# Patient Record
Sex: FEMALE | Race: WHITE | NOT HISPANIC OR LATINO | Employment: FULL TIME | URBAN - METROPOLITAN AREA
[De-identification: names, ages, dates, MRNs, and addresses within clinical notes are randomized per-mention and may not be internally consistent; named-entity substitution may affect disease eponyms.]

---

## 2024-06-11 ENCOUNTER — HOSPITAL ENCOUNTER (OUTPATIENT)
Facility: HOSPITAL | Age: 54
Discharge: HOME | End: 2024-06-13
Attending: EMERGENCY MEDICINE | Admitting: INTERNAL MEDICINE
Payer: MEDICAID

## 2024-06-11 DIAGNOSIS — I16.0 HYPERTENSIVE URGENCY: Primary | ICD-10-CM

## 2024-06-11 DIAGNOSIS — N39.0 ACUTE LOWER UTI: ICD-10-CM

## 2024-06-11 DIAGNOSIS — R07.9 CHEST PAIN: ICD-10-CM

## 2024-06-11 DIAGNOSIS — R07.9 CHEST PAIN, UNSPECIFIED TYPE: ICD-10-CM

## 2024-06-11 DIAGNOSIS — R07.9 EXERTIONAL CHEST PAIN: ICD-10-CM

## 2024-06-11 PROBLEM — I10 HYPERTENSION: Status: ACTIVE | Noted: 2024-06-11

## 2024-06-11 LAB
ALBUMIN SERPL BCP-MCNC: 3.8 G/DL (ref 3.5–5.2)
ALP SERPL-CCNC: 177 U/L (ref 55–135)
ALT SERPL W/O P-5'-P-CCNC: 16 U/L (ref 10–44)
ANION GAP SERPL CALC-SCNC: 9 MMOL/L (ref 8–16)
AST SERPL-CCNC: 21 U/L (ref 10–40)
BACTERIA #/AREA URNS HPF: ABNORMAL /HPF
BASOPHILS # BLD AUTO: 0.04 K/UL (ref 0–0.2)
BASOPHILS NFR BLD: 0.4 % (ref 0–1.9)
BILIRUB SERPL-MCNC: 0.3 MG/DL (ref 0.1–1)
BILIRUB UR QL STRIP: NEGATIVE
BNP SERPL-MCNC: <10 PG/ML (ref 0–99)
BUN SERPL-MCNC: 11 MG/DL (ref 6–20)
CALCIUM SERPL-MCNC: 9.5 MG/DL (ref 8.7–10.5)
CHLORIDE SERPL-SCNC: 107 MMOL/L (ref 95–110)
CLARITY UR: ABNORMAL
CO2 SERPL-SCNC: 25 MMOL/L (ref 23–29)
COLOR UR: YELLOW
CREAT SERPL-MCNC: 0.8 MG/DL (ref 0.5–1.4)
DIFFERENTIAL METHOD BLD: NORMAL
EOSINOPHIL # BLD AUTO: 0.3 K/UL (ref 0–0.5)
EOSINOPHIL NFR BLD: 2.4 % (ref 0–8)
ERYTHROCYTE [DISTWIDTH] IN BLOOD BY AUTOMATED COUNT: 13.2 % (ref 11.5–14.5)
EST. GFR  (NO RACE VARIABLE): >60 ML/MIN/1.73 M^2
GLUCOSE SERPL-MCNC: 100 MG/DL (ref 70–110)
GLUCOSE UR QL STRIP: NEGATIVE
HCT VFR BLD AUTO: 44 % (ref 37–48.5)
HGB BLD-MCNC: 14.1 G/DL (ref 12–16)
HGB UR QL STRIP: NEGATIVE
IMM GRANULOCYTES # BLD AUTO: 0.03 K/UL (ref 0–0.04)
IMM GRANULOCYTES NFR BLD AUTO: 0.3 % (ref 0–0.5)
KETONES UR QL STRIP: NEGATIVE
LEUKOCYTE ESTERASE UR QL STRIP: ABNORMAL
LYMPHOCYTES # BLD AUTO: 3 K/UL (ref 1–4.8)
LYMPHOCYTES NFR BLD: 28.5 % (ref 18–48)
MCH RBC QN AUTO: 27.9 PG (ref 27–31)
MCHC RBC AUTO-ENTMCNC: 32 G/DL (ref 32–36)
MCV RBC AUTO: 87 FL (ref 82–98)
MICROSCOPIC COMMENT: ABNORMAL
MONOCYTES # BLD AUTO: 0.7 K/UL (ref 0.3–1)
MONOCYTES NFR BLD: 6.7 % (ref 4–15)
NEUTROPHILS # BLD AUTO: 6.6 K/UL (ref 1.8–7.7)
NEUTROPHILS NFR BLD: 61.7 % (ref 38–73)
NITRITE UR QL STRIP: NEGATIVE
NRBC BLD-RTO: 0 /100 WBC
OHS QRS DURATION: 82 MS
OHS QTC CALCULATION: 468 MS
PH UR STRIP: 7 [PH] (ref 5–8)
PLATELET # BLD AUTO: 369 K/UL (ref 150–450)
PMV BLD AUTO: 10.2 FL (ref 9.2–12.9)
POTASSIUM SERPL-SCNC: 4 MMOL/L (ref 3.5–5.1)
PROT SERPL-MCNC: 7.7 G/DL (ref 6–8.4)
PROT UR QL STRIP: ABNORMAL
RBC # BLD AUTO: 5.05 M/UL (ref 4–5.4)
RBC #/AREA URNS HPF: 2 /HPF (ref 0–4)
SODIUM SERPL-SCNC: 141 MMOL/L (ref 136–145)
SP GR UR STRIP: 1.02 (ref 1–1.03)
SQUAMOUS #/AREA URNS HPF: 14 /HPF
TROPONIN I SERPL DL<=0.01 NG/ML-MCNC: <0.006 NG/ML (ref 0–0.03)
TROPONIN I SERPL DL<=0.01 NG/ML-MCNC: <0.006 NG/ML (ref 0–0.03)
TSH SERPL DL<=0.005 MIU/L-ACNC: 1.64 UIU/ML (ref 0.4–4)
URN SPEC COLLECT METH UR: ABNORMAL
UROBILINOGEN UR STRIP-ACNC: ABNORMAL EU/DL
WBC # BLD AUTO: 10.62 K/UL (ref 3.9–12.7)
WBC #/AREA URNS HPF: 35 /HPF (ref 0–5)

## 2024-06-11 PROCEDURE — 87186 SC STD MICRODIL/AGAR DIL: CPT | Mod: 59 | Performed by: NURSE PRACTITIONER

## 2024-06-11 PROCEDURE — 80053 COMPREHEN METABOLIC PANEL: CPT | Performed by: NURSE PRACTITIONER

## 2024-06-11 PROCEDURE — 84484 ASSAY OF TROPONIN QUANT: CPT | Mod: 91 | Performed by: NURSE PRACTITIONER

## 2024-06-11 PROCEDURE — 93005 ELECTROCARDIOGRAM TRACING: CPT

## 2024-06-11 PROCEDURE — 63600175 PHARM REV CODE 636 W HCPCS: Performed by: INTERNAL MEDICINE

## 2024-06-11 PROCEDURE — 87077 CULTURE AEROBIC IDENTIFY: CPT | Performed by: NURSE PRACTITIONER

## 2024-06-11 PROCEDURE — G0378 HOSPITAL OBSERVATION PER HR: HCPCS

## 2024-06-11 PROCEDURE — 93010 ELECTROCARDIOGRAM REPORT: CPT | Mod: ,,, | Performed by: INTERNAL MEDICINE

## 2024-06-11 PROCEDURE — 84443 ASSAY THYROID STIM HORMONE: CPT | Performed by: NURSE PRACTITIONER

## 2024-06-11 PROCEDURE — 96372 THER/PROPH/DIAG INJ SC/IM: CPT | Performed by: INTERNAL MEDICINE

## 2024-06-11 PROCEDURE — 81000 URINALYSIS NONAUTO W/SCOPE: CPT | Performed by: NURSE PRACTITIONER

## 2024-06-11 PROCEDURE — 36415 COLL VENOUS BLD VENIPUNCTURE: CPT | Performed by: NURSE PRACTITIONER

## 2024-06-11 PROCEDURE — 25000003 PHARM REV CODE 250: Performed by: INTERNAL MEDICINE

## 2024-06-11 PROCEDURE — 87088 URINE BACTERIA CULTURE: CPT | Performed by: NURSE PRACTITIONER

## 2024-06-11 PROCEDURE — 87086 URINE CULTURE/COLONY COUNT: CPT | Performed by: NURSE PRACTITIONER

## 2024-06-11 PROCEDURE — 25000003 PHARM REV CODE 250: Performed by: EMERGENCY MEDICINE

## 2024-06-11 PROCEDURE — 83880 ASSAY OF NATRIURETIC PEPTIDE: CPT | Performed by: NURSE PRACTITIONER

## 2024-06-11 PROCEDURE — 85025 COMPLETE CBC W/AUTO DIFF WBC: CPT | Performed by: NURSE PRACTITIONER

## 2024-06-11 PROCEDURE — 84484 ASSAY OF TROPONIN QUANT: CPT | Performed by: NURSE PRACTITIONER

## 2024-06-11 PROCEDURE — 99285 EMERGENCY DEPT VISIT HI MDM: CPT | Mod: 25

## 2024-06-11 PROCEDURE — 83036 HEMOGLOBIN GLYCOSYLATED A1C: CPT | Performed by: NURSE PRACTITIONER

## 2024-06-11 RX ORDER — BISACODYL 10 MG/1
10 SUPPOSITORY RECTAL DAILY PRN
Status: DISCONTINUED | OUTPATIENT
Start: 2024-06-11 | End: 2024-06-13 | Stop reason: HOSPADM

## 2024-06-11 RX ORDER — ASPIRIN 325 MG
325 TABLET ORAL
Status: COMPLETED | OUTPATIENT
Start: 2024-06-11 | End: 2024-06-11

## 2024-06-11 RX ORDER — LISINOPRIL 10 MG/1
10 TABLET ORAL
Status: COMPLETED | OUTPATIENT
Start: 2024-06-11 | End: 2024-06-11

## 2024-06-11 RX ORDER — ALUMINUM HYDROXIDE, MAGNESIUM HYDROXIDE, AND SIMETHICONE 1200; 120; 1200 MG/30ML; MG/30ML; MG/30ML
30 SUSPENSION ORAL 4 TIMES DAILY PRN
Status: DISCONTINUED | OUTPATIENT
Start: 2024-06-11 | End: 2024-06-13 | Stop reason: HOSPADM

## 2024-06-11 RX ORDER — SODIUM CHLORIDE 0.9 % (FLUSH) 0.9 %
10 SYRINGE (ML) INJECTION EVERY 12 HOURS PRN
Status: DISCONTINUED | OUTPATIENT
Start: 2024-06-11 | End: 2024-06-13 | Stop reason: HOSPADM

## 2024-06-11 RX ORDER — ENOXAPARIN SODIUM 100 MG/ML
40 INJECTION SUBCUTANEOUS EVERY 12 HOURS
Status: DISCONTINUED | OUTPATIENT
Start: 2024-06-11 | End: 2024-06-13 | Stop reason: HOSPADM

## 2024-06-11 RX ORDER — HYDROXYZINE PAMOATE 50 MG/1
50 CAPSULE ORAL 4 TIMES DAILY
COMMUNITY

## 2024-06-11 RX ORDER — CIPROFLOXACIN 750 MG/1
750 TABLET, FILM COATED ORAL EVERY 12 HOURS
Status: DISCONTINUED | OUTPATIENT
Start: 2024-06-11 | End: 2024-06-13 | Stop reason: HOSPADM

## 2024-06-11 RX ORDER — IBUPROFEN 200 MG
24 TABLET ORAL
Status: DISCONTINUED | OUTPATIENT
Start: 2024-06-11 | End: 2024-06-13 | Stop reason: HOSPADM

## 2024-06-11 RX ORDER — CIPROFLOXACIN 500 MG/1
500 TABLET ORAL EVERY 12 HOURS
Status: DISCONTINUED | OUTPATIENT
Start: 2024-06-11 | End: 2024-06-11 | Stop reason: DRUGHIGH

## 2024-06-11 RX ORDER — ACETAMINOPHEN 325 MG/1
650 TABLET ORAL EVERY 4 HOURS PRN
Status: DISCONTINUED | OUTPATIENT
Start: 2024-06-11 | End: 2024-06-13 | Stop reason: HOSPADM

## 2024-06-11 RX ORDER — TALC
6 POWDER (GRAM) TOPICAL NIGHTLY PRN
Status: DISCONTINUED | OUTPATIENT
Start: 2024-06-11 | End: 2024-06-13 | Stop reason: HOSPADM

## 2024-06-11 RX ORDER — CETIRIZINE HYDROCHLORIDE 10 MG/1
10 TABLET ORAL
COMMUNITY
Start: 2023-12-26 | End: 2024-06-11

## 2024-06-11 RX ORDER — ENOXAPARIN SODIUM 100 MG/ML
40 INJECTION SUBCUTANEOUS EVERY 24 HOURS
Status: DISCONTINUED | OUTPATIENT
Start: 2024-06-11 | End: 2024-06-11

## 2024-06-11 RX ORDER — CLONIDINE HYDROCHLORIDE 0.1 MG/1
0.1 TABLET ORAL
Status: COMPLETED | OUTPATIENT
Start: 2024-06-11 | End: 2024-06-11

## 2024-06-11 RX ORDER — IBUPROFEN 200 MG
16 TABLET ORAL
Status: DISCONTINUED | OUTPATIENT
Start: 2024-06-11 | End: 2024-06-13 | Stop reason: HOSPADM

## 2024-06-11 RX ORDER — ONDANSETRON HYDROCHLORIDE 2 MG/ML
4 INJECTION, SOLUTION INTRAVENOUS EVERY 8 HOURS PRN
Status: DISCONTINUED | OUTPATIENT
Start: 2024-06-11 | End: 2024-06-13 | Stop reason: HOSPADM

## 2024-06-11 RX ORDER — NALOXONE HCL 0.4 MG/ML
0.02 VIAL (ML) INJECTION
Status: DISCONTINUED | OUTPATIENT
Start: 2024-06-11 | End: 2024-06-13 | Stop reason: HOSPADM

## 2024-06-11 RX ORDER — HYDRALAZINE HYDROCHLORIDE 20 MG/ML
10 INJECTION INTRAMUSCULAR; INTRAVENOUS EVERY 6 HOURS PRN
Status: DISCONTINUED | OUTPATIENT
Start: 2024-06-11 | End: 2024-06-13 | Stop reason: HOSPADM

## 2024-06-11 RX ORDER — GLUCAGON 1 MG
1 KIT INJECTION
Status: DISCONTINUED | OUTPATIENT
Start: 2024-06-11 | End: 2024-06-13 | Stop reason: HOSPADM

## 2024-06-11 RX ORDER — MORPHINE SULFATE 4 MG/ML
2 INJECTION, SOLUTION INTRAMUSCULAR; INTRAVENOUS EVERY 4 HOURS PRN
Status: DISCONTINUED | OUTPATIENT
Start: 2024-06-11 | End: 2024-06-13 | Stop reason: HOSPADM

## 2024-06-11 RX ORDER — METHOCARBAMOL 750 MG/1
750 TABLET, FILM COATED ORAL EVERY 8 HOURS
COMMUNITY
Start: 2023-12-26 | End: 2024-06-11

## 2024-06-11 RX ADMIN — ASPIRIN 325 MG ORAL TABLET 325 MG: 325 PILL ORAL at 04:06

## 2024-06-11 RX ADMIN — NITROGLYCERIN 1 INCH: 20 OINTMENT TOPICAL at 04:06

## 2024-06-11 RX ADMIN — ENOXAPARIN SODIUM 40 MG: 40 INJECTION SUBCUTANEOUS at 09:06

## 2024-06-11 RX ADMIN — LISINOPRIL 10 MG: 10 TABLET ORAL at 04:06

## 2024-06-11 RX ADMIN — CLONIDINE HYDROCHLORIDE 0.1 MG: 0.1 TABLET ORAL at 05:06

## 2024-06-11 RX ADMIN — CIPROFLOXACIN 750 MG: 750 TABLET, FILM COATED ORAL at 09:06

## 2024-06-11 NOTE — ASSESSMENT & PLAN NOTE
Chronic, uncontrolled. Latest blood pressure and vitals reviewed-     Temp:  [98 °F (36.7 °C)]   Pulse:  [75-78]   Resp:  [17-19]   BP: (182-207)/(71-97)   SpO2:  [96 %-98 %] .   Home meds for hypertension were reviewed and noted below.       While in the hospital, will manage blood pressure as follows; Adjust home antihypertensive regimen as follows- patient not currently managed with hypertension medications as outpatient, reports out of medications for approximately 6 months.    Will utilize p.r.n. blood pressure medication only if patient's blood pressure greater than 160/100 and she develops symptoms such as worsening chest pain or shortness of breath.

## 2024-06-11 NOTE — PROGRESS NOTES
Pharmacist Renal Dose Adjustment Note    Garland Cotton is a 53 y.o. female being treated with the medication cipro    Patient Data:    Vital Signs (Most Recent):  Temp: 98 °F (36.7 °C) (06/11/24 1442)  Pulse: 75 (06/11/24 1747)  Resp: 19 (06/11/24 1615)  BP: (!) 184/88 (06/11/24 1802)  SpO2: 96 % (06/11/24 1747) Vital Signs (72h Range):  Temp:  [98 °F (36.7 °C)]   Pulse:  [75-78]   Resp:  [17-19]   BP: (182-207)/(71-97)   SpO2:  [96 %-98 %]      Recent Labs   Lab 06/11/24  1632   CREATININE 0.8     Serum creatinine: 0.8 mg/dL 06/11/24 1632  Estimated creatinine clearance: 92.7 mL/min    Medication:cipro 500mg BID will be changed to cipro 750mg BID for crcl > 30 ml/min  Pharmacist's Name: Elizabeth Jones  Pharmacist's Extension: [phone]

## 2024-06-11 NOTE — ASSESSMENT & PLAN NOTE
Intermittent chest pain for approximately 1 week.  Patient presented to urgent care, reported abnormal EKG, referred to ED for evaluation.  Cardiology consulted per ED physician, recommended observation, possible stress test in a.m..    --trend troponin  --tele  --echo  --NPO after midnight, cardiac diet for now  --vitals Q 4  --nitroglycerin q.6 H p.r.n. chest pain

## 2024-06-11 NOTE — PROGRESS NOTES
Pharmacist Renal Dose Adjustment Note    Garland Cotton is a 53 y.o. female being treated with the medication enoxaparin    Patient Data:    Vital Signs (Most Recent):  Temp: 98 °F (36.7 °C) (06/11/24 1442)  Pulse: 75 (06/11/24 1747)  Resp: 19 (06/11/24 1615)  BP: (!) 182/97 (06/11/24 1747)  SpO2: 96 % (06/11/24 1747) Vital Signs (72h Range):  Temp:  [98 °F (36.7 °C)]   Pulse:  [75-78]   Resp:  [17-19]   BP: (182-207)/(71-97)   SpO2:  [96 %-98 %]      Recent Labs   Lab 06/11/24  1632   CREATININE 0.8     Serum creatinine: 0.8 mg/dL 06/11/24 1632  Estimated creatinine clearance: 92.7 mL/min    Medication: enoxaparin dose: 40 mg frequency daily will be changed to medication: enoxaparin dose: 40 mg frequency: every 12 hours, for BMI > 40.    Pharmacist's Name: Lazara Ulrich

## 2024-06-11 NOTE — PHARMACY MED REC
"Admission Medication History     The home medication history was taken by Blair Bryant.    You may go to "Admission" then "Reconcile Home Medications" tabs to review and/or act upon these items.     The home medication list has been updated by the Pharmacy department.   Please read ALL comments highlighted in yellow.   Please address this information as you see fit.    Feel free to contact us if you have any questions or require assistance.      The medications listed below were removed from the home medication list. Please reorder if appropriate:  Patient reports no longer taking the following medication(s):  CETIRIZINE 10MG  ROBAXIN 750MG    Medications listed below were obtained from: Patient/family and Analytic software- [employer]  (Not in a hospital admission)        Blair Bryant  [id number]    Current Outpatient Medications on File Prior to Encounter   Medication Sig Dispense Refill Last Dose    hydrOXYzine pamoate (VISTARIL) 50 MG Cap Take 50 mg by mouth 4 (four) times daily.   6/10/2024                           .          "

## 2024-06-11 NOTE — H&P
[facility] - Emergency Dept.  Hospital Medicine  History & Physical    Patient Name: Garland Cotton  MRN: [id number]  Patient Class: OP- Observation  Admission Date: 2024  Attending Physician: Mona Traylor MD   Primary Care Provider: No primary care provider on file.         Patient information was obtained from patient, past medical records, and ER records.     Subjective:     Principal Problem:Chest pain    Chief Complaint:   Chief Complaint   Patient presents with    Chest Pain     Pt sent from  for abnormal EKG. Pt reports mid-sternal chest pain that radiates to left side of neck x 1 week. -SOB        HPI: 53 y.o. female patient with a PHx of HTN. Presented to the Emergency Department for evaluation of chest pain which onset 1 week PTA. Patient states that chest pain radiates to neck and symptoms last from 15 to 20 minutes. Patient was seen at   and referred to ED for abnormal EKG. Symptoms are intermittent and moderate in severity. Patient states that pain is worse with exertion. Associated sxs include diaphoresis, dizziness. Patient denies any current pain, and all other sxs at this time. Patient reports a FHx of MI. In the ED, vitals:  186/90, 75, 17, 98 F, 97% RA.  UA:  WBC: 35, bacteria: Moderate.  Troponin: Normal.  EKG:  NSR.  Patient is a full code.  Placed on observation under the care of Hospital Medicine for management of chest pain, UTI.    Past Medical History:   Diagnosis Date    Hypertension        Past Surgical History:   Procedure Laterality Date     SECTION      KNEE SURGERY         Review of patient's allergies indicates:   Allergen Reactions    Penicillins Anaphylaxis    Sumatriptan succinate      tachycardia       No current facility-administered medications on file prior to encounter.     Current Outpatient Medications on File Prior to Encounter   Medication Sig    hydrOXYzine pamoate (VISTARIL) 50 MG Cap Take 50 mg by mouth 4 (four) times daily.    [DISCONTINUED]  cetirizine (ZYRTEC) 10 MG tablet Take 10 mg by mouth.    [DISCONTINUED] methocarbamoL (ROBAXIN) 750 MG Tab Take 750 mg by mouth every 8 (eight) hours.     Family History       Problem Relation (Age of Onset)    Cancer Mother, Father    Heart disease Mother    Hyperlipidemia Mother          Tobacco Use    Smoking status: Every Day     Types: Cigarettes    Smokeless tobacco: Never   Substance and Sexual Activity    Alcohol use: Not Currently    Drug use: Not Currently    Sexual activity: Yes     Review of Systems   Constitutional:  Positive for diaphoresis.   Respiratory:  Positive for shortness of breath.    Cardiovascular:  Positive for chest pain.   All other systems reviewed and are negative.    Objective:     Vital Signs (Most Recent):  Temp: 98 °F (36.7 °C) (06/11/24 1442)  Pulse: 75 (06/11/24 1747)  Resp: 19 (06/11/24 1615)  BP: (!) 182/97 (06/11/24 1747)  SpO2: 96 % (06/11/24 1747) Vital Signs (24h Range):  Temp:  [98 °F (36.7 °C)] 98 °F (36.7 °C)  Pulse:  [75-78] 75  Resp:  [17-19] 19  SpO2:  [96 %-98 %] 96 %  BP: (182-207)/(71-97) 182/97     Weight: 105.4 kg (232 lb 5.8 oz)  Body mass index is 42.5 kg/m².     Physical Exam  Vitals and nursing note reviewed.   Constitutional:       General: She is not in acute distress.     Appearance: She is obese.   HENT:      Head: Normocephalic and atraumatic.      Right Ear: Hearing and external ear normal.      Left Ear: Hearing and external ear normal.      Nose: No rhinorrhea.      Right Sinus: No maxillary sinus tenderness or frontal sinus tenderness.      Left Sinus: No maxillary sinus tenderness or frontal sinus tenderness.      Mouth/Throat:      Mouth: No oral lesions.      Pharynx: Uvula midline.   Eyes:      General:         Right eye: No discharge.         Left eye: No discharge.      Conjunctiva/sclera: Conjunctivae normal.      Pupils: Pupils are equal, round, and reactive to light.   Neck:      Thyroid: No thyromegaly.      Vascular: No carotid bruit.       Trachea: No tracheal deviation.   Cardiovascular:      Rate and Rhythm: Normal rate and regular rhythm.      Pulses:           Dorsalis pedis pulses are 2+ on the right side and 2+ on the left side.      Heart sounds: Normal heart sounds, S1 normal and S2 normal. No murmur heard.  Pulmonary:      Effort: Pulmonary effort is normal. No respiratory distress.      Breath sounds: Normal breath sounds.   Abdominal:      General: Bowel sounds are normal. There is no distension.      Palpations: Abdomen is soft. There is no mass.      Tenderness: There is no abdominal tenderness.   Musculoskeletal:         General: Normal range of motion.      Cervical back: Normal range of motion.   Lymphadenopathy:      Cervical: No cervical adenopathy.      Upper Body:      Right upper body: No supraclavicular adenopathy.      Left upper body: No supraclavicular adenopathy.   Skin:     General: Skin is warm and dry.      Capillary Refill: Capillary refill takes less than 2 seconds.      Findings: No rash.   Neurological:      Mental Status: She is alert and oriented to person, place, and time.      Sensory: No sensory deficit.      Coordination: Coordination normal.      Gait: Gait normal.   Psychiatric:         Mood and Affect: Mood is not anxious or depressed.         Speech: Speech normal.         Behavior: Behavior normal.         Thought Content: Thought content normal.         Judgment: Judgment normal.              CRANIAL NERVES     CN III, IV, VI   Pupils are equal, round, and reactive to light.       Significant Labs: All pertinent labs within the past 24 hours have been reviewed.  CBC:   Recent Labs   Lab 06/11/24  1632   WBC 10.62   HGB 14.1   HCT 44.0        CMP:   Recent Labs   Lab 06/11/24  1632      K 4.0      CO2 25      BUN 11   CREATININE 0.8   CALCIUM 9.5   PROT 7.7   ALBUMIN 3.8   BILITOT 0.3   ALKPHOS 177*   AST 21   ALT 16   ANIONGAP 9     Cardiac Markers:   Recent Labs   Lab  06/11/24  1632   BNP <10     Troponin:   Recent Labs   Lab 06/11/24  1632   TROPONINI <0.006       Significant Imaging: I have reviewed all pertinent imaging results/findings within the past 24 hours.  Assessment/Plan:     * Chest pain  Intermittent chest pain for approximately 1 week.  Patient presented to urgent care, reported abnormal EKG, referred to ED for evaluation.  Cardiology consulted per ED physician, recommended observation, possible stress test in a.m..    --trend troponin  --tele  --echo  --NPO after midnight, cardiac diet for now  --vitals Q 4  --nitroglycerin q.6 H p.r.n. chest pain      UTI (urinary tract infection)  UA consistent with UTI.    --Cipro 500mg PO BID  --follow culture      Hypertension  Chronic, uncontrolled. Latest blood pressure and vitals reviewed-     Temp:  [98 °F (36.7 °C)]   Pulse:  [75-78]   Resp:  [17-19]   BP: (182-207)/(71-97)   SpO2:  [96 %-98 %] .   Home meds for hypertension were reviewed and noted below.       While in the hospital, will manage blood pressure as follows; Adjust home antihypertensive regimen as follows- patient not currently managed with hypertension medications as outpatient, reports out of medications for approximately 6 months.    Will utilize p.r.n. blood pressure medication only if patient's blood pressure greater than 160/100 and she develops symptoms such as worsening chest pain or shortness of breath.      VTE Risk Mitigation (From admission, onward)           Ordered     enoxaparin injection 40 mg  Every 12 hours         06/11/24 1800     IP VTE HIGH RISK PATIENT  Once         06/11/24 1758     Place sequential compression device  Until discontinued         06/11/24 1758                         On 06/11/2024, patient should be placed in hospital observation services under my care in collaboration with Mona Traylor MD.      Pharmacist Renal Dose Adjustment Note    Garland Cotton is a 53 y.o. female being treated with the medication  enoxaparin    Patient Data:    Vital Signs (Most Recent):  Temp: 98 °F (36.7 °C) (06/11/24 1442)  Pulse: 75 (06/11/24 1747)  Resp: 19 (06/11/24 1615)  BP: (!) 182/97 (06/11/24 1747)  SpO2: 96 % (06/11/24 1747) Vital Signs (72h Range):  Temp:  [98 °F (36.7 °C)]   Pulse:  [75-78]   Resp:  [17-19]   BP: (182-207)/(71-97)   SpO2:  [96 %-98 %]      Recent Labs   Lab 06/11/24  1632   CREATININE 0.8     Serum creatinine: 0.8 mg/dL 06/11/24 1632  Estimated creatinine clearance: 92.7 mL/min    Medication: enoxaparin dose: 40 mg frequency daily will be changed to medication: enoxaparin dose: 40 mg frequency: every 12 hours, for BMI > 40.    Pharmacist's Name: Lazara Conway NP  Department of Hospital Medicine  [facility] - Emergency Dept.

## 2024-06-11 NOTE — ED PROVIDER NOTES
SCRIBE #1 NOTE: I, Lazara Liu, am scribing for, and in the presence of, Skinny Guerra Jr., MD. I have scribed the entire note.       History     Chief Complaint   Patient presents with    Chest Pain     Pt sent from  for abnormal EKG. Pt reports mid-sternal chest pain that radiates to left side of neck x 1 week. -SOB     Review of patient's allergies indicates:   Allergen Reactions    Penicillins Anaphylaxis    Sumatriptan succinate      tachycardia         History of Present Illness     HPI    2024, 4:35 PM  History obtained from the patient      History of Present Illness: Garland Cotton is a 53 y.o. female patient with a PHx of HTN who presents to the Emergency Department for evaluation of chest pain which onset 1 week ago. Patient states that chest pain radiates to neck and symptoms last from 15 to 20 minutes. Patient was seen at  today and referred to ED for abnormal EKG. Symptoms are intermittent and moderate in severity. Patient states that pain is worse with exertion. Associated sxs include diaphoresis, dizziness. Patient denies any current pain, and all other sxs at this time. Patient report a FHx of MI. No further complaints or concerns at this time.       Arrival mode: Personal vehicle    PCP: No primary care provider on file.        Past Medical History:  Past Medical History:   Diagnosis Date    Hypertension        Past Surgical History:  Past Surgical History:   Procedure Laterality Date     SECTION      KNEE SURGERY           Family History:  Family History   Problem Relation Name Age of Onset    Hyperlipidemia Mother      Heart disease Mother      Cancer Mother      Cancer Father         Social History:  Social History     Tobacco Use    Smoking status: Every Day     Types: Cigarettes    Smokeless tobacco: Never   Substance and Sexual Activity    Alcohol use: Not Currently    Drug use: Not Currently    Sexual activity: Yes        Review of Systems     Review of Systems  "  Constitutional:  Positive for diaphoresis.   Cardiovascular:  Positive for chest pain (radiates to neck).   Neurological:  Positive for dizziness.        Physical Exam     Initial Vitals [06/11/24 1442]   BP Pulse Resp Temp SpO2   (!) 186/90 75 17 98 °F (36.7 °C) 97 %      MAP       --          Physical Exam  Nursing Notes and Vital Signs Reviewed.  Constitutional: Patient is in no acute distress. Well-developed and well-nourished.  Head: Atraumatic. Normocephalic.  Eyes:  EOM intact.  No scleral icterus.  ENT: Mucous membranes are moist.  Nares clear   Neck:  Full ROM. No JVD.  Cardiovascular: Regular rate. Regular rhythm No murmurs, rubs, or gallops. Distal pulses are 2+ and symmetric  Pulmonary/Chest: No respiratory distress. Clear to auscultation bilaterally. No wheezing or rales.  Equal chest wall rise bilaterally  Abdominal: Soft and non-distended.  There is no tenderness.  No rebound, guarding, or rigidity. Good bowel sounds.  Genitourinary: No CVA tenderness.  No suprapubic tenderness  Musculoskeletal: Moves all extremities. No obvious deformities.  5 x 5 strength in all extremities   Skin: Warm and dry.  Neurological:  Alert, awake, and appropriate.  Normal speech.  No acute focal neurological deficits are appreciated.  Two through 12 intact bilaterally.  Psychiatric: Normal affect. Good eye contact. Appropriate in content.     ED Course   Procedures  ED Vital Signs:  Vitals:    06/11/24 1442 06/11/24 1615 06/11/24 1639 06/11/24 1645   BP: (!) 186/90 (!) 207/93 (!) 203/71 (!) 191/86   Pulse: 75 78     Resp: 17 19     Temp: 98 °F (36.7 °C)      TempSrc: Oral      SpO2: 97% 98%     Weight: 105.4 kg (232 lb 5.8 oz)      Height: 5' 2" (1.575 m)       06/11/24 1715 06/11/24 1737 06/11/24 1747 06/11/24 1802   BP: (!) 190/91 (!) 189/95 (!) 182/97 (!) 184/88   Pulse:   75    Resp:       Temp:       TempSrc:       SpO2:   96%    Weight:       Height:        06/11/24 1848 06/11/24 1849   BP: (!) 155/75    Pulse:  " 70   Resp:     Temp:     TempSrc:     SpO2:  95%   Weight:     Height:         Abnormal Lab Results:  Labs Reviewed   COMPREHENSIVE METABOLIC PANEL - Abnormal; Notable for the following components:       Result Value    Alkaline Phosphatase 177 (*)     All other components within normal limits   URINALYSIS, REFLEX TO URINE CULTURE - Abnormal; Notable for the following components:    Appearance, UA Hazy (*)     Protein, UA Trace (*)     Urobilinogen, UA 2.0-3.0 (*)     Leukocytes, UA 1+ (*)     All other components within normal limits    Narrative:     Specimen Source->Urine   URINALYSIS MICROSCOPIC - Abnormal; Notable for the following components:    WBC, UA 35 (*)     Bacteria Moderate (*)     All other components within normal limits    Narrative:     Specimen Source->Urine   CULTURE, URINE   B-TYPE NATRIURETIC PEPTIDE   CBC W/ AUTO DIFFERENTIAL   TROPONIN I   TSH   TROPONIN I   HEMOGLOBIN A1C        All Lab Results:  Results for orders placed or performed during the hospital encounter of 06/11/24   Brain natriuretic peptide   Result Value Ref Range    BNP <10 0 - 99 pg/mL   CBC auto differential   Result Value Ref Range    WBC 10.62 3.90 - 12.70 K/uL    RBC 5.05 4.00 - 5.40 M/uL    Hemoglobin 14.1 12.0 - 16.0 g/dL    Hematocrit 44.0 37.0 - 48.5 %    MCV 87 82 - 98 fL    MCH 27.9 27.0 - 31.0 pg    MCHC 32.0 32.0 - 36.0 g/dL    RDW 13.2 11.5 - 14.5 %    Platelets 369 150 - 450 K/uL    MPV 10.2 9.2 - 12.9 fL    Immature Granulocytes 0.3 0.0 - 0.5 %    Gran # (ANC) 6.6 1.8 - 7.7 K/uL    Immature Grans (Abs) 0.03 0.00 - 0.04 K/uL    Lymph # 3.0 1.0 - 4.8 K/uL    Mono # 0.7 0.3 - 1.0 K/uL    Eos # 0.3 0.0 - 0.5 K/uL    Baso # 0.04 0.00 - 0.20 K/uL    nRBC 0 0 /100 WBC    Gran % 61.7 38.0 - 73.0 %    Lymph % 28.5 18.0 - 48.0 %    Mono % 6.7 4.0 - 15.0 %    Eosinophil % 2.4 0.0 - 8.0 %    Basophil % 0.4 0.0 - 1.9 %    Differential Method Automated    Comprehensive metabolic panel   Result Value Ref Range    Sodium 141  136 - 145 mmol/L    Potassium 4.0 3.5 - 5.1 mmol/L    Chloride 107 95 - 110 mmol/L    CO2 25 23 - 29 mmol/L    Glucose 100 70 - 110 mg/dL    BUN 11 6 - 20 mg/dL    Creatinine 0.8 0.5 - 1.4 mg/dL    Calcium 9.5 8.7 - 10.5 mg/dL    Total Protein 7.7 6.0 - 8.4 g/dL    Albumin 3.8 3.5 - 5.2 g/dL    Total Bilirubin 0.3 0.1 - 1.0 mg/dL    Alkaline Phosphatase 177 (H) 55 - 135 U/L    AST 21 10 - 40 U/L    ALT 16 10 - 44 U/L    eGFR >60 >60 mL/min/1.73 m^2    Anion Gap 9 8 - 16 mmol/L   Troponin I   Result Value Ref Range    Troponin I <0.006 0.000 - 0.026 ng/mL   Urinalysis, Reflex to Urine Culture Urine, Clean Catch    Specimen: Urine, Clean Catch   Result Value Ref Range    Specimen UA Urine, Clean Catch     Color, UA Yellow Yellow, Straw, Larisa    Appearance, UA Hazy (A) Clear    pH, UA 7.0 5.0 - 8.0    Specific Gravity, UA 1.025 1.005 - 1.030    Protein, UA Trace (A) Negative    Glucose, UA Negative Negative    Ketones, UA Negative Negative    Bilirubin (UA) Negative Negative    Occult Blood UA Negative Negative    Nitrite, UA Negative Negative    Urobilinogen, UA 2.0-3.0 (A) <2.0 EU/dL    Leukocytes, UA 1+ (A) Negative   Urinalysis Microscopic   Result Value Ref Range    RBC, UA 2 0 - 4 /hpf    WBC, UA 35 (H) 0 - 5 /hpf    Bacteria Moderate (A) None-Occ /hpf    Squam Epithel, UA 14 /hpf    Microscopic Comment SEE COMMENT    TSH   Result Value Ref Range    TSH 1.642 0.400 - 4.000 uIU/mL   EKG 12-lead   Result Value Ref Range    QRS Duration 82 ms    [facility] QTC Calculation 468 ms         Imaging Results:  Imaging Results              X-Ray Chest 1 View (Final result)  Result time 06/11/24 15:51:18      Final result by Linda Marie MD (06/11/24 15:51:18)                   Impression:      No acute abnormality.      Electronically signed by: Linda Marie  Date:    06/11/2024  Time:    15:51               Narrative:    EXAMINATION:  XR CHEST 1 VIEW    CLINICAL HISTORY:  . Chest pain,  unspecified    TECHNIQUE:  Single frontal portable view of the chest was performed.    COMPARISON:  None    FINDINGS:  Support devices: None    The lungs are clear, with normal appearance of pulmonary vasculature and no pleural effusion or pneumothorax.                                       The EKG was ordered, reviewed, and independently interpreted by the ED provider.  Interpretation time: 14:40:10  Rate: 74 BPM  Rhythm: normal sinus rhythm  Interpretation: Cannot rule out Anterior infarct. No STEMI.             The Emergency Provider reviewed the vital signs and test results, which are outlined above.     ED Discussion   5:46 PM: Discussed pt's case with Dr. Colmenares (Cardiology) who recommends observation and stress test in the morning.      5:52 PM: Discussed case with Dr. Traylor (Hospital Medicine). Dr. Traylor agrees with current care and management of pt and accepts admission.   Admitting Service: Hospital Medicine  Admitting Physician: Dr. Traylor  Admit to: Obs.    5:53 PM: Re-evaluated pt. I have discussed test results, shared treatment plan, and the need for admission with patient and family at bedside. Pt and family express understanding at this time and agree with all information. All questions answered. Pt and family have no further questions or concerns at this time. Pt is ready for admit.       ED Course as of 06/11/24 1907 Tue Jun 11, 2024   1652  Cardiac monitor interpretation   Independent interpretation   Indication: Chest pain   Normal sinus rhythm.  Rate 69.  No STEMI [RT]      ED Course User Index  [RT] Skinny Guerra Jr., MD     Medical Decision Making    Differential diagnosis: Chest pain, NSTEMI, STEMI, cardiac ischemia, angina, exertional angina, UTI     Patient was evaluated history physical examination.  Workup shows exertional dyspnea.  Troponin is negative however these symptoms become more pervasive last 20 minutes in her associated with diaphoresis.  Consultation obtained with  Cardiology recommended admission hospital medicine graciously accepted.  She also has a incidental finding of urinary tract infection    Amount and/or Complexity of Data Reviewed  Labs: ordered. Decision-making details documented in ED Course.     Details:  UA shows a mild UTI.  TSH is normal.  Troponin undetectable BNP is negative CMP is benign CBC is benign.  Radiology: ordered. Decision-making details documented in ED Course.     Details:  Chest x-ray shows no acute abnormality  ECG/medicine tests: ordered and independent interpretation performed. Decision-making details documented in ED Course.     Details:  No STEMI  Discussion of management or test interpretation with external provider(s):  Discussed the case with Dr. Colmenares with Cardiology who recommended admission full rule out and stress.  Hospital Medicine graciously accepted    Risk  OTC drugs.  Prescription drug management.  Decision regarding hospitalization.                ED Medication(s):  Medications   sodium chloride 0.9% flush 10 mL (has no administration in time range)   melatonin tablet 6 mg (has no administration in time range)   ondansetron injection 4 mg (has no administration in time range)   bisacodyL suppository 10 mg (has no administration in time range)   aluminum-magnesium hydroxide-simethicone 200-200-20 mg/5 mL suspension 30 mL (has no administration in time range)   acetaminophen tablet 650 mg (has no administration in time range)   morphine injection 2 mg (has no administration in time range)   naloxone 0.4 mg/mL injection 0.02 mg (has no administration in time range)   glucose chewable tablet 16 g (has no administration in time range)   glucose chewable tablet 24 g (has no administration in time range)   glucagon (human recombinant) injection 1 mg (has no administration in time range)   dextrose 10% bolus 125 mL 125 mL (has no administration in time range)   dextrose 10% bolus 250 mL 250 mL (has no administration in time range)    enoxaparin injection 40 mg (has no administration in time range)   nitroGLYCERIN 2% TD oint ointment 0.5 inch (has no administration in time range)   hydrALAZINE injection 10 mg (has no administration in time range)   ciprofloxacin HCl tablet 750 mg (has no administration in time range)   aspirin tablet 325 mg (325 mg Oral Given 6/11/24 1639)   nitroGLYCERIN 2% TD oint ointment 1 inch (1 inch Topical (Top) Given 6/11/24 1639)   lisinopriL tablet 10 mg (10 mg Oral Given 6/11/24 1639)   cloNIDine tablet 0.1 mg (0.1 mg Oral Given 6/11/24 1756)       New Prescriptions    No medications on file               Scribe Attestation:   Scribe #1: I performed the above scribed service and the documentation accurately describes the services I performed. I attest to the accuracy of the note.     Attending:   Physician Attestation Statement for Scribe #1: I, Skinny Guerra Jr., MD, personally performed the services described in this documentation, as scribed by Lazara Liu, in my presence, and it is both accurate and complete.           Clinical Impression       ICD-10-CM ICD-9-CM   1. Hypertensive urgency  I16.0 401.9   2. Chest pain  R07.9 786.50   3. Exertional chest pain  R07.9 786.50   4. Acute lower UTI  N39.0 599.0       Disposition:   Disposition: Placed in Observation  Condition: Serious         Skinny Guerra Jr., MD  06/11/24 6863

## 2024-06-11 NOTE — SUBJECTIVE & OBJECTIVE
Past Medical History:   Diagnosis Date    Hypertension        Past Surgical History:   Procedure Laterality Date     SECTION      KNEE SURGERY         Review of patient's allergies indicates:   Allergen Reactions    Penicillins Anaphylaxis    Sumatriptan succinate      tachycardia       No current facility-administered medications on file prior to encounter.     Current Outpatient Medications on File Prior to Encounter   Medication Sig    hydrOXYzine pamoate (VISTARIL) 50 MG Cap Take 50 mg by mouth 4 (four) times daily.    [DISCONTINUED] cetirizine (ZYRTEC) 10 MG tablet Take 10 mg by mouth.    [DISCONTINUED] methocarbamoL (ROBAXIN) 750 MG Tab Take 750 mg by mouth every 8 (eight) hours.     Family History       Problem Relation (Age of Onset)    Cancer Mother, Father    Heart disease Mother    Hyperlipidemia Mother          Tobacco Use    Smoking status: Every Day     Types: Cigarettes    Smokeless tobacco: Never   Substance and Sexual Activity    Alcohol use: Not Currently    Drug use: Not Currently    Sexual activity: Yes     Review of Systems   Constitutional:  Positive for diaphoresis.   Respiratory:  Positive for shortness of breath.    Cardiovascular:  Positive for chest pain.   All other systems reviewed and are negative.    Objective:     Vital Signs (Most Recent):  Temp: 98 °F (36.7 °C) (24 1442)  Pulse: 75 (24 1747)  Resp: 19 (24 1615)  BP: (!) 182/97 (24 1747)  SpO2: 96 % (24 174) Vital Signs (24h Range):  Temp:  [98 °F (36.7 °C)] 98 °F (36.7 °C)  Pulse:  [75-78] 75  Resp:  [17-19] 19  SpO2:  [96 %-98 %] 96 %  BP: (182-207)/(71-97) 182/97     Weight: 105.4 kg (232 lb 5.8 oz)  Body mass index is 42.5 kg/m².     Physical Exam  Vitals and nursing note reviewed.   Constitutional:       General: She is not in acute distress.     Appearance: She is obese.   HENT:      Head: Normocephalic and atraumatic.      Right Ear: Hearing and external ear normal.      Left Ear: Hearing  and external ear normal.      Nose: No rhinorrhea.      Right Sinus: No maxillary sinus tenderness or frontal sinus tenderness.      Left Sinus: No maxillary sinus tenderness or frontal sinus tenderness.      Mouth/Throat:      Mouth: No oral lesions.      Pharynx: Uvula midline.   Eyes:      General:         Right eye: No discharge.         Left eye: No discharge.      Conjunctiva/sclera: Conjunctivae normal.      Pupils: Pupils are equal, round, and reactive to light.   Neck:      Thyroid: No thyromegaly.      Vascular: No carotid bruit.      Trachea: No tracheal deviation.   Cardiovascular:      Rate and Rhythm: Normal rate and regular rhythm.      Pulses:           Dorsalis pedis pulses are 2+ on the right side and 2+ on the left side.      Heart sounds: Normal heart sounds, S1 normal and S2 normal. No murmur heard.  Pulmonary:      Effort: Pulmonary effort is normal. No respiratory distress.      Breath sounds: Normal breath sounds.   Abdominal:      General: Bowel sounds are normal. There is no distension.      Palpations: Abdomen is soft. There is no mass.      Tenderness: There is no abdominal tenderness.   Musculoskeletal:         General: Normal range of motion.      Cervical back: Normal range of motion.   Lymphadenopathy:      Cervical: No cervical adenopathy.      Upper Body:      Right upper body: No supraclavicular adenopathy.      Left upper body: No supraclavicular adenopathy.   Skin:     General: Skin is warm and dry.      Capillary Refill: Capillary refill takes less than 2 seconds.      Findings: No rash.   Neurological:      Mental Status: She is alert and oriented to person, place, and time.      Sensory: No sensory deficit.      Coordination: Coordination normal.      Gait: Gait normal.   Psychiatric:         Mood and Affect: Mood is not anxious or depressed.         Speech: Speech normal.         Behavior: Behavior normal.         Thought Content: Thought content normal.         Judgment:  Judgment normal.              CRANIAL NERVES     CN III, IV, VI   Pupils are equal, round, and reactive to light.       Significant Labs: All pertinent labs within the past 24 hours have been reviewed.  CBC:   Recent Labs   Lab 06/11/24  1632   WBC 10.62   HGB 14.1   HCT 44.0        CMP:   Recent Labs   Lab 06/11/24  1632      K 4.0      CO2 25      BUN 11   CREATININE 0.8   CALCIUM 9.5   PROT 7.7   ALBUMIN 3.8   BILITOT 0.3   ALKPHOS 177*   AST 21   ALT 16   ANIONGAP 9     Cardiac Markers:   Recent Labs   Lab 06/11/24  1632   BNP <10     Troponin:   Recent Labs   Lab 06/11/24  1632   TROPONINI <0.006       Significant Imaging: I have reviewed all pertinent imaging results/findings within the past 24 hours.

## 2024-06-11 NOTE — HPI
53 y.o. female patient with a PHx of HTN. Presented to the Emergency Department for evaluation of chest pain which onset 1 week PTA. Patient states that chest pain radiates to neck and symptoms last from 15 to 20 minutes. Patient was seen at   and referred to ED for abnormal EKG. Symptoms are intermittent and moderate in severity. Patient states that pain is worse with exertion. Associated sxs include diaphoresis, dizziness. Patient denies any current pain, and all other sxs at this time. Patient reports a FHx of MI. In the ED, vitals:  186/90, 75, 17, 98 F, 97% RA.  UA:  WBC: 35, bacteria: Moderate.  Troponin: Normal.  EKG:  NSR.  Patient is a full code.  Placed on observation under the care of Hospital Medicine for management of chest pain, UTI.

## 2024-06-12 LAB
ALBUMIN SERPL BCP-MCNC: 3.4 G/DL (ref 3.5–5.2)
ALP SERPL-CCNC: 155 U/L (ref 55–135)
ALT SERPL W/O P-5'-P-CCNC: 13 U/L (ref 10–44)
ANION GAP SERPL CALC-SCNC: 8 MMOL/L (ref 8–16)
AORTIC ROOT ANNULUS: 2.61 CM
ASCENDING AORTA: 2.66 CM
AST SERPL-CCNC: 20 U/L (ref 10–40)
AV INDEX (PROSTH): 0.77
AV MEAN GRADIENT: 3 MMHG
AV PEAK GRADIENT: 6 MMHG
AV VALVE AREA BY VELOCITY RATIO: 1.82 CM²
AV VALVE AREA: 2.14 CM²
AV VELOCITY RATIO: 0.66
BASOPHILS # BLD AUTO: 0.05 K/UL (ref 0–0.2)
BASOPHILS NFR BLD: 0.5 % (ref 0–1.9)
BILIRUB SERPL-MCNC: 0.4 MG/DL (ref 0.1–1)
BSA FOR ECHO PROCEDURE: 2.15 M2
BUN SERPL-MCNC: 13 MG/DL (ref 6–20)
CALCIUM SERPL-MCNC: 8.9 MG/DL (ref 8.7–10.5)
CHLORIDE SERPL-SCNC: 108 MMOL/L (ref 95–110)
CO2 SERPL-SCNC: 23 MMOL/L (ref 23–29)
CREAT SERPL-MCNC: 1.1 MG/DL (ref 0.5–1.4)
CV ECHO LV RWT: 0.52 CM
CV STRESS BASE HR: 68 BPM
DIASTOLIC BLOOD PRESSURE: 69 MMHG
DIFFERENTIAL METHOD BLD: ABNORMAL
DOP CALC AO PEAK VEL: 1.22 M/S
DOP CALC AO VTI: 25.7 CM
DOP CALC LVOT AREA: 2.8 CM2
DOP CALC LVOT DIAMETER: 1.88 CM
DOP CALC LVOT PEAK VEL: 0.8 M/S
DOP CALC LVOT STROKE VOLUME: 54.94 CM3
DOP CALC RVOT PEAK VEL: 0.59 M/S
DOP CALC RVOT VTI: 16 CM
DOP CALCLVOT PEAK VEL VTI: 19.8 CM
E WAVE DECELERATION TIME: 236.1 MSEC
E/A RATIO: 1
E/E' RATIO: 5.91 M/S
ECHO LV POSTERIOR WALL: 1.13 CM (ref 0.6–1.1)
EJECTION FRACTION- HIGH: 73 %
END DIASTOLIC INDEX-HIGH: 165 ML/M2
END DIASTOLIC INDEX-LOW: 101 ML/M2
END SYSTOLIC INDEX-HIGH: 64 ML/M2
END SYSTOLIC INDEX-LOW: 28 ML/M2
EOSINOPHIL # BLD AUTO: 0.3 K/UL (ref 0–0.5)
EOSINOPHIL NFR BLD: 2.7 % (ref 0–8)
ERYTHROCYTE [DISTWIDTH] IN BLOOD BY AUTOMATED COUNT: 13.2 % (ref 11.5–14.5)
EST. GFR  (NO RACE VARIABLE): >60 ML/MIN/1.73 M^2
ESTIMATED AVG GLUCOSE: 114 MG/DL (ref 68–131)
FRACTIONAL SHORTENING: 33 % (ref 28–44)
GLUCOSE SERPL-MCNC: 118 MG/DL (ref 70–110)
HBA1C MFR BLD: 5.6 % (ref 4–5.6)
HCT VFR BLD AUTO: 41.1 % (ref 37–48.5)
HGB BLD-MCNC: 12.9 G/DL (ref 12–16)
IMM GRANULOCYTES # BLD AUTO: 0.04 K/UL (ref 0–0.04)
IMM GRANULOCYTES NFR BLD AUTO: 0.4 % (ref 0–0.5)
INTERVENTRICULAR SEPTUM: 1.22 CM (ref 0.6–1.1)
IVC DIAMETER: 1.25 CM
IVRT: 51.38 MSEC
LA MAJOR: 4.16 CM
LA MINOR: 3.85 CM
LA WIDTH: 3.3 CM
LEFT ATRIUM SIZE: 3.12 CM
LEFT ATRIUM VOLUME INDEX: 17.2 ML/M2
LEFT ATRIUM VOLUME: 35 CM3
LEFT INTERNAL DIMENSION IN SYSTOLE: 2.89 CM (ref 2.1–4)
LEFT VENTRICLE DIASTOLIC VOLUME INDEX: 41.72 ML/M2
LEFT VENTRICLE DIASTOLIC VOLUME: 85.11 ML
LEFT VENTRICLE MASS INDEX: 89 G/M2
LEFT VENTRICLE SYSTOLIC VOLUME INDEX: 15.7 ML/M2
LEFT VENTRICLE SYSTOLIC VOLUME: 31.94 ML
LEFT VENTRICULAR INTERNAL DIMENSION IN DIASTOLE: 4.34 CM (ref 3.5–6)
LEFT VENTRICULAR MASS: 181.7 G
LV LATERAL E/E' RATIO: 6.18 M/S
LV SEPTAL E/E' RATIO: 5.67 M/S
LVOT MG: 1.94 MMHG
LVOT MV: 0.69 CM/S
LYMPHOCYTES # BLD AUTO: 3.4 K/UL (ref 1–4.8)
LYMPHOCYTES NFR BLD: 34.1 % (ref 18–48)
MAGNESIUM SERPL-MCNC: 2 MG/DL (ref 1.6–2.6)
MCH RBC QN AUTO: 27.9 PG (ref 27–31)
MCHC RBC AUTO-ENTMCNC: 31.4 G/DL (ref 32–36)
MCV RBC AUTO: 89 FL (ref 82–98)
MONOCYTES # BLD AUTO: 0.7 K/UL (ref 0.3–1)
MONOCYTES NFR BLD: 6.6 % (ref 4–15)
MV PEAK A VEL: 0.68 M/S
MV PEAK E VEL: 0.68 M/S
MV STENOSIS PRESSURE HALF TIME: 68.47 MS
MV VALVE AREA P 1/2 METHOD: 3.21 CM2
NEUTROPHILS # BLD AUTO: 5.5 K/UL (ref 1.8–7.7)
NEUTROPHILS NFR BLD: 55.7 % (ref 38–73)
NRBC BLD-RTO: 0 /100 WBC
NUC REST EJECTION FRACTION: 84
NUC STRESS EJECTION FRACTION: 69 %
OHS CV CPX 85 PERCENT MAX PREDICTED HEART RATE MALE: 142
OHS CV CPX MAX PREDICTED HEART RATE: 167
OHS CV CPX PATIENT IS FEMALE: 1
OHS CV CPX PATIENT IS MALE: 0
OHS CV CPX PEAK DIASTOLIC BLOOD PRESSURE: 69 MMHG
OHS CV CPX PEAK HEAR RATE: 91 BPM
OHS CV CPX PEAK RATE PRESSURE PRODUCT: NORMAL
OHS CV CPX PEAK SYSTOLIC BLOOD PRESSURE: 125 MMHG
OHS CV CPX PERCENT MAX PREDICTED HEART RATE ACHIEVED: 57
OHS CV CPX RATE PRESSURE PRODUCT PRESENTING: 8500
PHOSPHATE SERPL-MCNC: 3.2 MG/DL (ref 2.7–4.5)
PISA TR MAX VEL: 2.3 M/S
PLATELET # BLD AUTO: 332 K/UL (ref 150–450)
PMV BLD AUTO: 10.3 FL (ref 9.2–12.9)
POTASSIUM SERPL-SCNC: 4.4 MMOL/L (ref 3.5–5.1)
PROT SERPL-MCNC: 6.7 G/DL (ref 6–8.4)
PV MEAN GRADIENT: 1 MMHG
RA MAJOR: 3.95 CM
RA PRESSURE ESTIMATED: 3 MMHG
RA WIDTH: 2.8 CM
RBC # BLD AUTO: 4.63 M/UL (ref 4–5.4)
RETIRED EF AND QEF - SEE NOTES: 59 %
RV TB RVSP: 5 MMHG
SODIUM SERPL-SCNC: 139 MMOL/L (ref 136–145)
STJ: 2.78 CM
SYSTOLIC BLOOD PRESSURE: 125 MMHG
TDI LATERAL: 0.11 M/S
TDI SEPTAL: 0.12 M/S
TDI: 0.12 M/S
TR MAX PG: 21 MMHG
TRICUSPID ANNULAR PLANE SYSTOLIC EXCURSION: 1.72 CM
TROPONIN I SERPL DL<=0.01 NG/ML-MCNC: <0.006 NG/ML (ref 0–0.03)
TV REST PULMONARY ARTERY PRESSURE: 24 MMHG
WBC # BLD AUTO: 9.81 K/UL (ref 3.9–12.7)
Z-SCORE OF LEFT VENTRICULAR DIMENSION IN END DIASTOLE: -3.37
Z-SCORE OF LEFT VENTRICULAR DIMENSION IN END SYSTOLE: -2.01

## 2024-06-12 PROCEDURE — 84100 ASSAY OF PHOSPHORUS: CPT | Performed by: NURSE PRACTITIONER

## 2024-06-12 PROCEDURE — 63600175 PHARM REV CODE 636 W HCPCS: Performed by: INTERNAL MEDICINE

## 2024-06-12 PROCEDURE — G0378 HOSPITAL OBSERVATION PER HR: HCPCS

## 2024-06-12 PROCEDURE — 85025 COMPLETE CBC W/AUTO DIFF WBC: CPT | Performed by: NURSE PRACTITIONER

## 2024-06-12 PROCEDURE — 96372 THER/PROPH/DIAG INJ SC/IM: CPT | Performed by: INTERNAL MEDICINE

## 2024-06-12 PROCEDURE — 63600175 PHARM REV CODE 636 W HCPCS: Performed by: NURSE PRACTITIONER

## 2024-06-12 PROCEDURE — 63600175 PHARM REV CODE 636 W HCPCS: Performed by: FAMILY MEDICINE

## 2024-06-12 PROCEDURE — 80053 COMPREHEN METABOLIC PANEL: CPT | Performed by: NURSE PRACTITIONER

## 2024-06-12 PROCEDURE — 84484 ASSAY OF TROPONIN QUANT: CPT | Performed by: NURSE PRACTITIONER

## 2024-06-12 PROCEDURE — 99203 OFFICE O/P NEW LOW 30 MIN: CPT | Mod: 25,,, | Performed by: INTERNAL MEDICINE

## 2024-06-12 PROCEDURE — 25000003 PHARM REV CODE 250: Performed by: INTERNAL MEDICINE

## 2024-06-12 PROCEDURE — 36415 COLL VENOUS BLD VENIPUNCTURE: CPT | Performed by: NURSE PRACTITIONER

## 2024-06-12 PROCEDURE — A9502 TC99M TETROFOSMIN: HCPCS | Performed by: FAMILY MEDICINE

## 2024-06-12 PROCEDURE — 96374 THER/PROPH/DIAG INJ IV PUSH: CPT

## 2024-06-12 PROCEDURE — 83735 ASSAY OF MAGNESIUM: CPT | Performed by: NURSE PRACTITIONER

## 2024-06-12 RX ORDER — REGADENOSON 0.08 MG/ML
0.4 INJECTION, SOLUTION INTRAVENOUS ONCE
Status: COMPLETED | OUTPATIENT
Start: 2024-06-12 | End: 2024-06-12

## 2024-06-12 RX ADMIN — TETROFOSMIN 30.4 MILLICURIE: 1.38 INJECTION, POWDER, LYOPHILIZED, FOR SOLUTION INTRAVENOUS at 10:06

## 2024-06-12 RX ADMIN — CIPROFLOXACIN 750 MG: 750 TABLET, FILM COATED ORAL at 08:06

## 2024-06-12 RX ADMIN — ENOXAPARIN SODIUM 40 MG: 40 INJECTION SUBCUTANEOUS at 08:06

## 2024-06-12 RX ADMIN — TETROFOSMIN 9.8 MILLICURIE: 1.38 INJECTION, POWDER, LYOPHILIZED, FOR SOLUTION INTRAVENOUS at 09:06

## 2024-06-12 RX ADMIN — REGADENOSON 0.4 MG: 0.08 INJECTION, SOLUTION INTRAVENOUS at 10:06

## 2024-06-12 RX ADMIN — HYDRALAZINE HYDROCHLORIDE 10 MG: 20 INJECTION, SOLUTION INTRAMUSCULAR; INTRAVENOUS at 08:06

## 2024-06-12 NOTE — CONSULTS
[facility] - Telemetry ([facility])  Cardiology  Consult Note    Patient Name: Garland Cotton  MRN: [id number]  Admission Date: 2024  Hospital Length of Stay: 0 days  Code Status: Full Code   Attending Provider: Jamie Blair MD   Consulting Provider: Clover Rosales PA-C  Primary Care Physician: No primary care provider on file.  Principal Problem:Chest pain    Patient information was obtained from patient, past medical records, and ER records.     Inpatient consult to Cardiology  Consult performed by: Clover Rosales PA-C  Consult ordered by: Daisha Conway NP        Subjective:     Chief Complaint:  Chest pain    HPI:   Ms. Cotton is a 53 year old female patient whose current medical conditions include HTN who presented to [facility] ED yesterday due to sharp left-sided chest pain symptoms, ongoing for the past week. Patient reported the pain radiated upward toward her neck and worsened with activity/exertion. She stated episodes typically lasted 15-20 minutes and then spontaneously resolved. Associated symptoms included dizziness. She denied any associated fever, SOB, palpitations, near syncope, or syncope. Initial workup in ED revealed elevated BP (186/90) and +UTI and patient was subsequently admitted for further evaluation and treatment. Cardiology consulted to assist with management. Patient seen and examined today, resting in bed. Feels ok. Currently CP free. +TTP to chest wall. No prior CV history. Serial troponin negative. EKG reviewed, SR, cannot rule out anterior infarct. TTE and MPI stress test pending.    5    Past Medical History:   Diagnosis Date    Hypertension        Past Surgical History:   Procedure Laterality Date     SECTION      KNEE SURGERY         Review of patient's allergies indicates:   Allergen Reactions    Penicillins Anaphylaxis    Sumatriptan succinate      tachycardia       No current facility-administered medications on file prior to encounter.     Current Outpatient  Medications on File Prior to Encounter   Medication Sig    hydrOXYzine pamoate (VISTARIL) 50 MG Cap Take 50 mg by mouth 4 (four) times daily.     Family History       Problem Relation (Age of Onset)    Cancer Mother, Father    Heart disease Mother    Hyperlipidemia Mother          Tobacco Use    Smoking status: Every Day     Types: Cigarettes    Smokeless tobacco: Never   Substance and Sexual Activity    Alcohol use: Not Currently    Drug use: Not Currently    Sexual activity: Yes     Review of Systems   Constitutional: Positive for malaise/fatigue.   HENT: Negative.     Eyes: Negative.    Cardiovascular:  Positive for chest pain.   Endocrine: Negative.    Hematologic/Lymphatic: Negative.    Musculoskeletal:         Left shoulder     Gastrointestinal: Negative.    Genitourinary: Negative.    Neurological: Negative.    Psychiatric/Behavioral: Negative.     Allergic/Immunologic: Negative.      Objective:     Vital Signs (Most Recent):  Temp: 97.7 °F (36.5 °C) (06/12/24 0832)  Pulse: 69 (06/12/24 1117)  Resp: 18 (06/12/24 0832)  BP: 125/69 (06/12/24 1117)  SpO2: 96 % (06/12/24 0832) Vital Signs (24h Range):  Temp:  [97.7 °F (36.5 °C)-98 °F (36.7 °C)] 97.7 °F (36.5 °C)  Pulse:  [56-78] 69  Resp:  [16-19] 18  SpO2:  [94 %-98 %] 96 %  BP: (110-207)/(55-97) 125/69     Weight: 105.2 kg (232 lb)  Body mass index is 42.43 kg/m².    SpO2: 96 %       No intake or output data in the 24 hours ending 06/12/24 1130    Lines/Drains/Airways       None                    Physical Exam  Vitals and nursing note reviewed.   Constitutional:       General: She is not in acute distress.     Appearance: She is well-developed. She is not diaphoretic.   HENT:      Head: Normocephalic and atraumatic.   Eyes:      General:         Right eye: No discharge.         Left eye: No discharge.      Pupils: Pupils are equal, round, and reactive to light.   Cardiovascular:      Rate and Rhythm: Normal rate and regular rhythm.      Heart sounds: Normal  heart sounds, S1 normal and S2 normal. No murmur heard.     Comments: Chest wall + TTP  Pulmonary:      Effort: Pulmonary effort is normal. No respiratory distress.   Abdominal:      General: There is no distension.   Musculoskeletal:      Right lower leg: No edema.      Left lower leg: No edema.   Skin:     General: Skin is warm and dry.      Findings: No erythema.   Neurological:      Mental Status: She is alert and oriented to person, place, and time.   Psychiatric:         Mood and Affect: Mood normal.         Behavior: Behavior normal.          Significant Labs: CMP   Recent Labs   Lab 06/11/24  1632 06/12/24  0509    139   K 4.0 4.4    108   CO2 25 23    118*   BUN 11 13   CREATININE 0.8 1.1   CALCIUM 9.5 8.9   PROT 7.7 6.7   ALBUMIN 3.8 3.4*   BILITOT 0.3 0.4   ALKPHOS 177* 155*   AST 21 20   ALT 16 13   ANIONGAP 9 8   , CBC   Recent Labs   Lab 06/11/24  1632 06/12/24  0509   WBC 10.62 9.81   HGB 14.1 12.9   HCT 44.0 41.1    332   , Troponin   Recent Labs   Lab 06/11/24  1632 06/11/24 2015 06/12/24  0001   TROPONINI <0.006 <0.006 <0.006   , and All pertinent lab results from the last 24 hours have been reviewed.    Significant Imaging: Echocardiogram: Transthoracic echo (TTE) complete (Cupid Only):   Results for orders placed or performed during the hospital encounter of 06/11/24   Echo Saline Bubble? No; Ultrasound enhancing contrast? No   Result Value Ref Range    BSA 2.15 m2    LVOT stroke volume 54.94 cm3    LVIDd 4.34 3.5 - 6.0 cm    LV Systolic Volume 31.94 mL    LV Systolic Volume Index 15.7 mL/m2    LVIDs 2.89 2.1 - 4.0 cm    LV Diastolic Volume 85.11 mL    LV Diastolic Volume Index 41.72 mL/m2    IVS 1.22 (A) 0.6 - 1.1 cm    LVOT diameter 1.88 cm    LVOT area 2.8 cm2    FS 33 28 - 44 %    Left Ventricle Relative Wall Thickness 0.52 cm    Posterior Wall 1.13 (A) 0.6 - 1.1 cm    LV mass 181.70 g    LV Mass Index 89 g/m2    MV Peak E Iraj 0.68 m/s    TDI LATERAL 0.11 m/s    TDI  SEPTAL 0.12 m/s    E/E' ratio 5.91 m/s    MV Peak A Iraj 0.68 m/s    TR Max Iraj 2.30 m/s    E/A ratio 1.00     IVRT 51.38 msec    E wave deceleration time 236.10 msec    LV SEPTAL E/E' RATIO 5.67 m/s    LV LATERAL E/E' RATIO 6.18 m/s    LVOT peak iraj 0.80 m/s    Left Ventricular Outflow Tract Mean Velocity 0.69 cm/s    Left Ventricular Outflow Tract Mean Gradient 1.94 mmHg    RVOT peak VTI 16.0 cm    TAPSE 1.72 cm    LA size 3.12 cm    Left Atrium Minor Axis 3.85 cm    Left Atrium Major Axis 4.16 cm    RA Major Axis 3.95 cm    AV mean gradient 3 mmHg    AV peak gradient 6 mmHg    Ao peak iraj 1.22 m/s    Ao VTI 25.70 cm    LVOT peak VTI 19.80 cm    AV valve area 2.14 cm²    AV Velocity Ratio 0.66     AV index (prosthetic) 0.77     DIPESH by Velocity Ratio 1.82 cm²    MV stenosis pressure 1/2 time 68.47 ms    MV valve area p 1/2 method 3.21 cm2    Triscuspid Valve Regurgitation Peak Gradient 21 mmHg    PV mean gradient 1 mmHg    PV peak gradient 1     RVOT peak iraj 0.59 m/s    Ao root annulus 2.61 cm    STJ 2.78 cm    Ascending aorta 2.66 cm    IVC diameter 1.25 cm    Mean e' 0.12 m/s    ZLVIDS -2.01     ZLVIDD -3.37     LA Volume Index 17.2 mL/m2    LA volume 35.00 cm3    LA WIDTH 3.3 cm    RA Width 2.8 cm    and EKG: reviewed  Assessment and Plan:   Patient who presents with CP with typical and atypical features. Serial troponin negative. MPI stress test and TTE pending. Monitor BP trend.    * Chest pain  -Presents with atypical and typical CP features  -Serial troponin negative  -+TTP chest wall  -TTE and MPI stress test pending    UTI (urinary tract infection)  -Mgmt as per primary team, on abx    Hypertension  -Monitor BP trend  -Adjust meds as needed        VTE Risk Mitigation (From admission, onward)           Ordered     enoxaparin injection 40 mg  Every 12 hours         06/11/24 1800     IP VTE HIGH RISK PATIENT  Once         06/11/24 1758     Place sequential compression device  Until discontinued          06/11/24 5689                    Thank you for your consult. I will follow-up with patient. Please contact us if you have any additional questions.    Clover Rosales PA-C  Cardiology   [facility] - Telemetry ([facility])

## 2024-06-12 NOTE — ASSESSMENT & PLAN NOTE
-Presents with atypical and typical CP features  -Serial troponin negative  -+TTP chest wall  -TTE and MPI stress test pending

## 2024-06-12 NOTE — ASSESSMENT & PLAN NOTE
Intermittent chest pain for approximately 1 week.  Patient presented to urgent care, reported abnormal EKG, referred to ED for evaluation.  Cardiology consulted per ED physician, recommended observation, possible stress test in a.m..    --trend troponin  --tele  --echo  --NPO after midnight, cardiac diet for now  --vitals Q 4  --nitroglycerin q.6 H p.r.n. chest pain    Chest pain improved with aspirin and nsaid  Worse with exertion, better with rest  Troponin(s) negative   [facility] maternal MI,  in 50s  Awaiting stress test per cardiology   Echocardiogram pending

## 2024-06-12 NOTE — H&P (VIEW-ONLY)
[facility] - Telemetry ([facility])  Cardiology  Consult Note    Patient Name: Garland Cotton  MRN: [id number]  Admission Date: 2024  Hospital Length of Stay: 0 days  Code Status: Full Code   Attending Provider: Jamie Blair MD   Consulting Provider: Clover Rosales PA-C  Primary Care Physician: No primary care provider on file.  Principal Problem:Chest pain    Patient information was obtained from patient, past medical records, and ER records.     Inpatient consult to Cardiology  Consult performed by: Clover Rosales PA-C  Consult ordered by: Daisha Conway NP        Subjective:     Chief Complaint:  Chest pain    HPI:   Ms. Ctoton is a 53 year old female patient whose current medical conditions include HTN who presented to [facility] ED yesterday due to sharp left-sided chest pain symptoms, ongoing for the past week. Patient reported the pain radiated upward toward her neck and worsened with activity/exertion. She stated episodes typically lasted 15-20 minutes and then spontaneously resolved. Associated symptoms included dizziness. She denied any associated fever, SOB, palpitations, near syncope, or syncope. Initial workup in ED revealed elevated BP (186/90) and +UTI and patient was subsequently admitted for further evaluation and treatment. Cardiology consulted to assist with management. Patient seen and examined today, resting in bed. Feels ok. Currently CP free. +TTP to chest wall. No prior CV history. Serial troponin negative. EKG reviewed, SR, cannot rule out anterior infarct. TTE and MPI stress test pending.    5    Past Medical History:   Diagnosis Date    Hypertension        Past Surgical History:   Procedure Laterality Date     SECTION      KNEE SURGERY         Review of patient's allergies indicates:   Allergen Reactions    Penicillins Anaphylaxis    Sumatriptan succinate      tachycardia       No current facility-administered medications on file prior to encounter.     Current Outpatient  Medications on File Prior to Encounter   Medication Sig    hydrOXYzine pamoate (VISTARIL) 50 MG Cap Take 50 mg by mouth 4 (four) times daily.     Family History       Problem Relation (Age of Onset)    Cancer Mother, Father    Heart disease Mother    Hyperlipidemia Mother          Tobacco Use    Smoking status: Every Day     Types: Cigarettes    Smokeless tobacco: Never   Substance and Sexual Activity    Alcohol use: Not Currently    Drug use: Not Currently    Sexual activity: Yes     Review of Systems   Constitutional: Positive for malaise/fatigue.   HENT: Negative.     Eyes: Negative.    Cardiovascular:  Positive for chest pain.   Endocrine: Negative.    Hematologic/Lymphatic: Negative.    Musculoskeletal:         Left shoulder     Gastrointestinal: Negative.    Genitourinary: Negative.    Neurological: Negative.    Psychiatric/Behavioral: Negative.     Allergic/Immunologic: Negative.      Objective:     Vital Signs (Most Recent):  Temp: 97.7 °F (36.5 °C) (06/12/24 0832)  Pulse: 69 (06/12/24 1117)  Resp: 18 (06/12/24 0832)  BP: 125/69 (06/12/24 1117)  SpO2: 96 % (06/12/24 0832) Vital Signs (24h Range):  Temp:  [97.7 °F (36.5 °C)-98 °F (36.7 °C)] 97.7 °F (36.5 °C)  Pulse:  [56-78] 69  Resp:  [16-19] 18  SpO2:  [94 %-98 %] 96 %  BP: (110-207)/(55-97) 125/69     Weight: 105.2 kg (232 lb)  Body mass index is 42.43 kg/m².    SpO2: 96 %       No intake or output data in the 24 hours ending 06/12/24 1130    Lines/Drains/Airways       None                    Physical Exam  Vitals and nursing note reviewed.   Constitutional:       General: She is not in acute distress.     Appearance: She is well-developed. She is not diaphoretic.   HENT:      Head: Normocephalic and atraumatic.   Eyes:      General:         Right eye: No discharge.         Left eye: No discharge.      Pupils: Pupils are equal, round, and reactive to light.   Cardiovascular:      Rate and Rhythm: Normal rate and regular rhythm.      Heart sounds: Normal  heart sounds, S1 normal and S2 normal. No murmur heard.     Comments: Chest wall + TTP  Pulmonary:      Effort: Pulmonary effort is normal. No respiratory distress.   Abdominal:      General: There is no distension.   Musculoskeletal:      Right lower leg: No edema.      Left lower leg: No edema.   Skin:     General: Skin is warm and dry.      Findings: No erythema.   Neurological:      Mental Status: She is alert and oriented to person, place, and time.   Psychiatric:         Mood and Affect: Mood normal.         Behavior: Behavior normal.          Significant Labs: CMP   Recent Labs   Lab 06/11/24  1632 06/12/24  0509    139   K 4.0 4.4    108   CO2 25 23    118*   BUN 11 13   CREATININE 0.8 1.1   CALCIUM 9.5 8.9   PROT 7.7 6.7   ALBUMIN 3.8 3.4*   BILITOT 0.3 0.4   ALKPHOS 177* 155*   AST 21 20   ALT 16 13   ANIONGAP 9 8   , CBC   Recent Labs   Lab 06/11/24  1632 06/12/24  0509   WBC 10.62 9.81   HGB 14.1 12.9   HCT 44.0 41.1    332   , Troponin   Recent Labs   Lab 06/11/24  1632 06/11/24 2015 06/12/24  0001   TROPONINI <0.006 <0.006 <0.006   , and All pertinent lab results from the last 24 hours have been reviewed.    Significant Imaging: Echocardiogram: Transthoracic echo (TTE) complete (Cupid Only):   Results for orders placed or performed during the hospital encounter of 06/11/24   Echo Saline Bubble? No; Ultrasound enhancing contrast? No   Result Value Ref Range    BSA 2.15 m2    LVOT stroke volume 54.94 cm3    LVIDd 4.34 3.5 - 6.0 cm    LV Systolic Volume 31.94 mL    LV Systolic Volume Index 15.7 mL/m2    LVIDs 2.89 2.1 - 4.0 cm    LV Diastolic Volume 85.11 mL    LV Diastolic Volume Index 41.72 mL/m2    IVS 1.22 (A) 0.6 - 1.1 cm    LVOT diameter 1.88 cm    LVOT area 2.8 cm2    FS 33 28 - 44 %    Left Ventricle Relative Wall Thickness 0.52 cm    Posterior Wall 1.13 (A) 0.6 - 1.1 cm    LV mass 181.70 g    LV Mass Index 89 g/m2    MV Peak E Iraj 0.68 m/s    TDI LATERAL 0.11 m/s    TDI  SEPTAL 0.12 m/s    E/E' ratio 5.91 m/s    MV Peak A Iraj 0.68 m/s    TR Max Iraj 2.30 m/s    E/A ratio 1.00     IVRT 51.38 msec    E wave deceleration time 236.10 msec    LV SEPTAL E/E' RATIO 5.67 m/s    LV LATERAL E/E' RATIO 6.18 m/s    LVOT peak iraj 0.80 m/s    Left Ventricular Outflow Tract Mean Velocity 0.69 cm/s    Left Ventricular Outflow Tract Mean Gradient 1.94 mmHg    RVOT peak VTI 16.0 cm    TAPSE 1.72 cm    LA size 3.12 cm    Left Atrium Minor Axis 3.85 cm    Left Atrium Major Axis 4.16 cm    RA Major Axis 3.95 cm    AV mean gradient 3 mmHg    AV peak gradient 6 mmHg    Ao peak iraj 1.22 m/s    Ao VTI 25.70 cm    LVOT peak VTI 19.80 cm    AV valve area 2.14 cm²    AV Velocity Ratio 0.66     AV index (prosthetic) 0.77     DIPESH by Velocity Ratio 1.82 cm²    MV stenosis pressure 1/2 time 68.47 ms    MV valve area p 1/2 method 3.21 cm2    Triscuspid Valve Regurgitation Peak Gradient 21 mmHg    PV mean gradient 1 mmHg    PV peak gradient 1     RVOT peak iraj 0.59 m/s    Ao root annulus 2.61 cm    STJ 2.78 cm    Ascending aorta 2.66 cm    IVC diameter 1.25 cm    Mean e' 0.12 m/s    ZLVIDS -2.01     ZLVIDD -3.37     LA Volume Index 17.2 mL/m2    LA volume 35.00 cm3    LA WIDTH 3.3 cm    RA Width 2.8 cm    and EKG: reviewed  Assessment and Plan:   Patient who presents with CP with typical and atypical features. Serial troponin negative. MPI stress test and TTE pending. Monitor BP trend.    * Chest pain  -Presents with atypical and typical CP features  -Serial troponin negative  -+TTP chest wall  -TTE and MPI stress test pending    UTI (urinary tract infection)  -Mgmt as per primary team, on abx    Hypertension  -Monitor BP trend  -Adjust meds as needed        VTE Risk Mitigation (From admission, onward)           Ordered     enoxaparin injection 40 mg  Every 12 hours         06/11/24 1800     IP VTE HIGH RISK PATIENT  Once         06/11/24 1758     Place sequential compression device  Until discontinued          06/11/24 2014                    Thank you for your consult. I will follow-up with patient. Please contact us if you have any additional questions.    Clover Rosales PA-C  Cardiology   [facility] - Telemetry ([facility])

## 2024-06-12 NOTE — HPI
Ms. Cotton is a 53 year old female patient whose current medical conditions include HTN who presented to [facility] ED yesterday due to sharp left-sided chest pain symptoms, ongoing for the past week. Patient reported the pain radiated upward toward her neck and worsened with activity/exertion. She stated episodes typically lasted 15-20 minutes and then spontaneously resolved. Associated symptoms included dizziness. She denied any associated fever, SOB, palpitations, near syncope, or syncope. Initial workup in ED revealed elevated BP (186/90) and +UTI and patient was subsequently admitted for further evaluation and treatment. Cardiology consulted to assist with management. Patient seen and examined today, resting in bed. Feels ok. Currently CP free. +TTP to chest wall. No prior CV history. Serial troponin negative. EKG reviewed, SR, cannot rule out anterior infarct. TTE and MPI stress test pending.    5

## 2024-06-12 NOTE — PROGRESS NOTES
[facility] Telemetry ([facility])  [facility] Medicine  Progress Note    Patient Name: Garland Cotton  MRN: [id number]  Patient Class: OP- Observation   Admission Date: 2024  Length of Stay: 0 days  Attending Physician: Jamie Blair MD  Primary Care Provider: No primary care provider on file.        Subjective:     Principal Problem:Chest pain        HPI:  53 y.o. female patient with a PHx of HTN. Presented to the Emergency Department for evaluation of chest pain which onset 1 week PTA. Patient states that chest pain radiates to neck and symptoms last from 15 to 20 minutes. Patient was seen at   and referred to ED for abnormal EKG. Symptoms are intermittent and moderate in severity. Patient states that pain is worse with exertion. Associated sxs include diaphoresis, dizziness. Patient denies any current pain, and all other sxs at this time. Patient reports a FHx of MI. In the ED, vitals:  186/90, 75, 17, 98 F, 97% RA.  UA:  WBC: 35, bacteria: Moderate.  Troponin: Normal.  EKG:  NSR.  Patient is a full code.  Placed on observation under the care of Hospital Medicine for management of chest pain, UTI.    Overview/Hospital Course:   admitted for chest pain and hypertension urgency. Worse with exertion, better with rest and aspirin. Radiated to jaw. Onset 2 weeks. Awaiting stress test per cardiology. h maternal  in 50s from MI. Continue intravenous antibiotic(s) for uti. Complains of back pain    Interval History: See hospital course for today      Review of Systems   Constitutional:  Positive for diaphoresis. Negative for activity change, appetite change and fever.   Respiratory:  Negative for shortness of breath.    Cardiovascular:  Positive for chest pain (radiating to jaw).   Gastrointestinal:  Positive for nausea. Negative for abdominal pain and vomiting.   Genitourinary:  Positive for flank pain.   Neurological:  Positive for dizziness. Negative for weakness.   Psychiatric/Behavioral:  Negative for  agitation, behavioral problems, confusion, decreased concentration and dysphoric mood. The patient is not nervous/anxious.      Objective:     Vital Signs (Most Recent):  Temp: 97.7 °F (36.5 °C) (06/12/24 0832)  Pulse: 68 (06/12/24 0832)  Resp: 18 (06/12/24 0832)  BP: (!) 111/55 (06/12/24 0832)  SpO2: 96 % (06/12/24 0832) Vital Signs (24h Range):  Temp:  [97.7 °F (36.5 °C)-98 °F (36.7 °C)] 97.7 °F (36.5 °C)  Pulse:  [56-78] 68  Resp:  [16-19] 18  SpO2:  [94 %-98 %] 96 %  BP: (110-207)/(55-97) 111/55     Weight: 105.5 kg (232 lb 9.4 oz)  Body mass index is 42.54 kg/m².  No intake or output data in the 24 hours ending 06/12/24 0854      Physical Exam  Vitals and nursing note reviewed.   Constitutional:       General: She is not in acute distress.     Appearance: She is morbidly obese. She is ill-appearing. She is not toxic-appearing.   HENT:      Head: Normocephalic and atraumatic.      Mouth/Throat:      Dentition: Abnormal dentition.   Cardiovascular:      Rate and Rhythm: Normal rate.      Heart sounds: No murmur heard.  Pulmonary:      Effort: Pulmonary effort is normal. No respiratory distress.      Breath sounds: No wheezing or rales.      Comments: Distant breath sounds  Abdominal:      Palpations: Abdomen is soft.      Tenderness: There is no abdominal tenderness. There is no right CVA tenderness or left CVA tenderness.   Musculoskeletal:      Right lower leg: Edema present.      Left lower leg: Edema present.   Skin:     General: Skin is warm.   Neurological:      Mental Status: She is alert and oriented to person, place, and time.      Motor: Weakness present.   Psychiatric:         Mood and Affect: Mood normal.         Behavior: Behavior normal. Behavior is cooperative.             Significant Labs: All pertinent labs within the past 24 hours have been reviewed.  CBC:   Recent Labs   Lab 06/11/24  1632 06/12/24  0509   WBC 10.62 9.81   HGB 14.1 12.9   HCT 44.0 41.1    332     CMP:   Recent Labs   Lab  24  1632 24  0509    139   K 4.0 4.4    108   CO2 25 23    118*   BUN 11 13   CREATININE 0.8 1.1   CALCIUM 9.5 8.9   PROT 7.7 6.7   ALBUMIN 3.8 3.4*   BILITOT 0.3 0.4   ALKPHOS 177* 155*   AST 21 20   ALT 16 13   ANIONGAP 9 8     Cardiac Markers:   Recent Labs   Lab 24  1632   BNP <10       Troponin:   Recent Labs   Lab 24  1632 24  0001   TROPONINI <0.006 <0.006 <0.006     TSH:   Recent Labs   Lab 24  1809   TSH 1.642     Urine Studies:   Recent Labs   Lab 24  1710   COLORU Yellow   APPEARANCEUA Hazy*   PHUR 7.0   SPECGRAV 1.025   PROTEINUA Trace*   GLUCUA Negative   KETONESU Negative   BILIRUBINUA Negative   OCCULTUA Negative   NITRITE Negative   UROBILINOGEN 2.0-3.0*   LEUKOCYTESUR 1+*   RBCUA 2   WBCUA 35*   BACTERIA Moderate*   SQUAMEPITHEL 14     Urine culture pending    Significant Imaging: I have reviewed all pertinent imaging results/findings within the past 24 hours.  CXR: I have reviewed all pertinent results/findings within the past 24 hours and my personal findings are:  no acute abnormality    Assessment/Plan:      * Chest pain  Intermittent chest pain for approximately 1 week.  Patient presented to urgent care, reported abnormal EKG, referred to ED for evaluation.  Cardiology consulted per ED physician, recommended observation, possible stress test in a.m..    --trend troponin  --tele  --echo  --NPO after midnight, cardiac diet for now  --vitals Q 4  --nitroglycerin q.6 H p.r.n. chest pain    Chest pain improved with aspirin and nsaid  Worse with exertion, better with rest  Troponin(s) negative   [facility] maternal MI,  in 50s  Awaiting stress test per cardiology   Echocardiogram pending      UTI (urinary tract infection)  UA consistent with UTI.    --Cipro 500mg PO BID  --follow culture      Hypertension  Chronic, controlled. Latest blood pressure and vitals reviewed-     Temp:  [97.7 °F (36.5 °C)-98 °F (36.7 °C)]   Pulse:   [56-78]   Resp:  [16-19]   BP: (110-207)/(55-97)   SpO2:  [94 %-98 %] .   Home meds for hypertension were reviewed and noted below.       While in the hospital, will manage blood pressure as follows; Adjust home antihypertensive regimen as follows- patient not currently managed with hypertension medications as outpatient, reports out of medications for approximately 6 months.    Will utilize p.r.n. blood pressure medication only if patient's blood pressure greater than 180/110 and she develops symptoms such as worsening chest pain or shortness of breath.    Elevated blood pressure on admission  Improved       VTE Risk Mitigation (From admission, onward)           Ordered     enoxaparin injection 40 mg  Every 12 hours         06/11/24 1800     IP VTE HIGH RISK PATIENT  Once         06/11/24 1758     Place sequential compression device  Until discontinued         06/11/24 1758                    Discharge Planning   EDDA:      Code Status: Full Code   Is the patient medically ready for discharge?:     Reason for patient still in hospital (select all that apply): Patient trending condition, Laboratory test, Treatment, Imaging, Consult recommendations, and Pending disposition                     Jamie Blair MD  Department of Hospital Medicine   [facility] - Telemetry ([facility])

## 2024-06-12 NOTE — HOSPITAL COURSE
admitted for chest pain and hypertension urgency. Worse with exertion, better with rest and aspirin. Radiated to jaw. Onset 2 weeks. Awaiting stress test per cardiology. [facility] maternal  in 50s from MI. Continue intravenous antibiotic(s) for uti. Complains of back pain      Status post cardiac cath procedure. Tolerated well. After discussing cardiology, ok to discharge home with new prescription regimen of aspirin, statin and diltiazem. Discussed vaping cessation.    No acute distress. No respiratory distress. On room air. Left arm in wrist splint. AO3. Morbidly obese. Nursing present.    Patient seen and evaluated by me. Patient was determined to be suitable for discharge. Patient deemed stable for discharge to home with nurse practitioner to visit home program and prescriptions delivered to bedside.

## 2024-06-12 NOTE — SUBJECTIVE & OBJECTIVE
Past Medical History:   Diagnosis Date    Hypertension        Past Surgical History:   Procedure Laterality Date     SECTION      KNEE SURGERY         Review of patient's allergies indicates:   Allergen Reactions    Penicillins Anaphylaxis    Sumatriptan succinate      tachycardia       No current facility-administered medications on file prior to encounter.     Current Outpatient Medications on File Prior to Encounter   Medication Sig    hydrOXYzine pamoate (VISTARIL) 50 MG Cap Take 50 mg by mouth 4 (four) times daily.     Family History       Problem Relation (Age of Onset)    Cancer Mother, Father    Heart disease Mother    Hyperlipidemia Mother          Tobacco Use    Smoking status: Every Day     Types: Cigarettes    Smokeless tobacco: Never   Substance and Sexual Activity    Alcohol use: Not Currently    Drug use: Not Currently    Sexual activity: Yes     Review of Systems   Constitutional: Positive for malaise/fatigue.   HENT: Negative.     Eyes: Negative.    Cardiovascular:  Positive for chest pain.   Endocrine: Negative.    Hematologic/Lymphatic: Negative.    Musculoskeletal:         Left shoulder     Gastrointestinal: Negative.    Genitourinary: Negative.    Neurological: Negative.    Psychiatric/Behavioral: Negative.     Allergic/Immunologic: Negative.      Objective:     Vital Signs (Most Recent):  Temp: 97.7 °F (36.5 °C) (24 0832)  Pulse: 69 (24 1117)  Resp: 18 (24 0832)  BP: 125/69 (24 1117)  SpO2: 96 % (24 0832) Vital Signs (24h Range):  Temp:  [97.7 °F (36.5 °C)-98 °F (36.7 °C)] 97.7 °F (36.5 °C)  Pulse:  [56-78] 69  Resp:  [16-19] 18  SpO2:  [94 %-98 %] 96 %  BP: (110-207)/(55-97) 125/69     Weight: 105.2 kg (232 lb)  Body mass index is 42.43 kg/m².    SpO2: 96 %       No intake or output data in the 24 hours ending 24 1130    Lines/Drains/Airways       None                    Physical Exam  Vitals and nursing note reviewed.   Constitutional:        General: She is not in acute distress.     Appearance: She is well-developed. She is not diaphoretic.   HENT:      Head: Normocephalic and atraumatic.   Eyes:      General:         Right eye: No discharge.         Left eye: No discharge.      Pupils: Pupils are equal, round, and reactive to light.   Cardiovascular:      Rate and Rhythm: Normal rate and regular rhythm.      Heart sounds: Normal heart sounds, S1 normal and S2 normal. No murmur heard.     Comments: Chest wall + TTP  Pulmonary:      Effort: Pulmonary effort is normal. No respiratory distress.   Abdominal:      General: There is no distension.   Musculoskeletal:      Right lower leg: No edema.      Left lower leg: No edema.   Skin:     General: Skin is warm and dry.      Findings: No erythema.   Neurological:      Mental Status: She is alert and oriented to person, place, and time.   Psychiatric:         Mood and Affect: Mood normal.         Behavior: Behavior normal.          Significant Labs: CMP   Recent Labs   Lab 06/11/24  1632 06/12/24  0509    139   K 4.0 4.4    108   CO2 25 23    118*   BUN 11 13   CREATININE 0.8 1.1   CALCIUM 9.5 8.9   PROT 7.7 6.7   ALBUMIN 3.8 3.4*   BILITOT 0.3 0.4   ALKPHOS 177* 155*   AST 21 20   ALT 16 13   ANIONGAP 9 8   , CBC   Recent Labs   Lab 06/11/24  1632 06/12/24  0509   WBC 10.62 9.81   HGB 14.1 12.9   HCT 44.0 41.1    332   , Troponin   Recent Labs   Lab 06/11/24 1632 06/11/24 2015 06/12/24  0001   TROPONINI <0.006 <0.006 <0.006   , and All pertinent lab results from the last 24 hours have been reviewed.    Significant Imaging: Echocardiogram: Transthoracic echo (TTE) complete ([facility] Only):   Results for orders placed or performed during the hospital encounter of 06/11/24   Echo Saline Bubble? No; Ultrasound enhancing contrast? No   Result Value Ref Range    BSA 2.15 m2    LVOT stroke volume 54.94 cm3    LVIDd 4.34 3.5 - 6.0 cm    LV Systolic Volume 31.94 mL    LV Systolic Volume Index  15.7 mL/m2    LVIDs 2.89 2.1 - 4.0 cm    LV Diastolic Volume 85.11 mL    LV Diastolic Volume Index 41.72 mL/m2    IVS 1.22 (A) 0.6 - 1.1 cm    LVOT diameter 1.88 cm    LVOT area 2.8 cm2    FS 33 28 - 44 %    Left Ventricle Relative Wall Thickness 0.52 cm    Posterior Wall 1.13 (A) 0.6 - 1.1 cm    LV mass 181.70 g    LV Mass Index 89 g/m2    MV Peak E Iraj 0.68 m/s    TDI LATERAL 0.11 m/s    TDI SEPTAL 0.12 m/s    E/E' ratio 5.91 m/s    MV Peak A Iraj 0.68 m/s    TR Max Iraj 2.30 m/s    E/A ratio 1.00     IVRT 51.38 msec    E wave deceleration time 236.10 msec    LV SEPTAL E/E' RATIO 5.67 m/s    LV LATERAL E/E' RATIO 6.18 m/s    LVOT peak iraj 0.80 m/s    Left Ventricular Outflow Tract Mean Velocity 0.69 cm/s    Left Ventricular Outflow Tract Mean Gradient 1.94 mmHg    RVOT peak VTI 16.0 cm    TAPSE 1.72 cm    LA size 3.12 cm    Left Atrium Minor Axis 3.85 cm    Left Atrium Major Axis 4.16 cm    RA Major Axis 3.95 cm    AV mean gradient 3 mmHg    AV peak gradient 6 mmHg    Ao peak iraj 1.22 m/s    Ao VTI 25.70 cm    LVOT peak VTI 19.80 cm    AV valve area 2.14 cm²    AV Velocity Ratio 0.66     AV index (prosthetic) 0.77     DIPESH by Velocity Ratio 1.82 cm²    MV stenosis pressure 1/2 time 68.47 ms    MV valve area p 1/2 method 3.21 cm2    Triscuspid Valve Regurgitation Peak Gradient 21 mmHg    PV mean gradient 1 mmHg    PV peak gradient 1     RVOT peak iraj 0.59 m/s    Ao root annulus 2.61 cm    STJ 2.78 cm    Ascending aorta 2.66 cm    IVC diameter 1.25 cm    Mean e' 0.12 m/s    ZLVIDS -2.01     ZLVIDD -3.37     LA Volume Index 17.2 mL/m2    LA volume 35.00 cm3    LA WIDTH 3.3 cm    RA Width 2.8 cm    and EKG: reviewed

## 2024-06-12 NOTE — SUBJECTIVE & OBJECTIVE
Interval History: See hospital course for today      Review of Systems   Constitutional:  Positive for diaphoresis. Negative for activity change, appetite change and fever.   Respiratory:  Negative for shortness of breath.    Cardiovascular:  Positive for chest pain (radiating to jaw).   Gastrointestinal:  Positive for nausea. Negative for abdominal pain and vomiting.   Genitourinary:  Positive for flank pain.   Neurological:  Positive for dizziness. Negative for weakness.   Psychiatric/Behavioral:  Negative for agitation, behavioral problems, confusion, decreased concentration and dysphoric mood. The patient is not nervous/anxious.      Objective:     Vital Signs (Most Recent):  Temp: 97.7 °F (36.5 °C) (06/12/24 0832)  Pulse: 68 (06/12/24 0832)  Resp: 18 (06/12/24 0832)  BP: (!) 111/55 (06/12/24 0832)  SpO2: 96 % (06/12/24 0832) Vital Signs (24h Range):  Temp:  [97.7 °F (36.5 °C)-98 °F (36.7 °C)] 97.7 °F (36.5 °C)  Pulse:  [56-78] 68  Resp:  [16-19] 18  SpO2:  [94 %-98 %] 96 %  BP: (110-207)/(55-97) 111/55     Weight: 105.5 kg (232 lb 9.4 oz)  Body mass index is 42.54 kg/m².  No intake or output data in the 24 hours ending 06/12/24 0854      Physical Exam  Vitals and nursing note reviewed.   Constitutional:       General: She is not in acute distress.     Appearance: She is morbidly obese. She is ill-appearing. She is not toxic-appearing.   HENT:      Head: Normocephalic and atraumatic.      Mouth/Throat:      Dentition: Abnormal dentition.   Cardiovascular:      Rate and Rhythm: Normal rate.      Heart sounds: No murmur heard.  Pulmonary:      Effort: Pulmonary effort is normal. No respiratory distress.      Breath sounds: No wheezing or rales.      Comments: Distant breath sounds  Abdominal:      Palpations: Abdomen is soft.      Tenderness: There is no abdominal tenderness. There is no right CVA tenderness or left CVA tenderness.   Musculoskeletal:      Right lower leg: Edema present.      Left lower leg: Edema  present.   Skin:     General: Skin is warm.   Neurological:      Mental Status: She is alert and oriented to person, place, and time.      Motor: Weakness present.   Psychiatric:         Mood and Affect: Mood normal.         Behavior: Behavior normal. Behavior is cooperative.             Significant Labs: All pertinent labs within the past 24 hours have been reviewed.  CBC:   Recent Labs   Lab 06/11/24  1632 06/12/24  0509   WBC 10.62 9.81   HGB 14.1 12.9   HCT 44.0 41.1    332     CMP:   Recent Labs   Lab 06/11/24  1632 06/12/24  0509    139   K 4.0 4.4    108   CO2 25 23    118*   BUN 11 13   CREATININE 0.8 1.1   CALCIUM 9.5 8.9   PROT 7.7 6.7   ALBUMIN 3.8 3.4*   BILITOT 0.3 0.4   ALKPHOS 177* 155*   AST 21 20   ALT 16 13   ANIONGAP 9 8     Cardiac Markers:   Recent Labs   Lab 06/11/24  1632   BNP <10       Troponin:   Recent Labs   Lab 06/11/24  1632 06/11/24 2015 06/12/24  0001   TROPONINI <0.006 <0.006 <0.006     TSH:   Recent Labs   Lab 06/11/24  1809   TSH 1.642     Urine Studies:   Recent Labs   Lab 06/11/24  1710   COLORU Yellow   APPEARANCEUA Hazy*   PHUR 7.0   SPECGRAV 1.025   PROTEINUA Trace*   GLUCUA Negative   KETONESU Negative   BILIRUBINUA Negative   OCCULTUA Negative   NITRITE Negative   UROBILINOGEN 2.0-3.0*   LEUKOCYTESUR 1+*   RBCUA 2   WBCUA 35*   BACTERIA Moderate*   SQUAMEPITHEL 14     Urine culture pending    Significant Imaging: I have reviewed all pertinent imaging results/findings within the past 24 hours.  CXR: I have reviewed all pertinent results/findings within the past 24 hours and my personal findings are:  no acute abnormality

## 2024-06-12 NOTE — PLAN OF CARE
[facility] - Telemetry (Hospital)  Initial Discharge Assessment       Primary Care Provider: No primary care provider on file. - Patient states she does not have PCP, but wishes to be set up with PCP upon discharge    Admission Diagnosis: Exertional chest pain [R07.9]  Acute lower UTI [N39.0]  Hypertensive urgency [I16.0]  Chest pain [R07.9]    Admission Date: 6/11/2024  Expected Discharge Date: Per Attending    Transition of Care Barriers: None    Payor: MEDICAID / Plan: [place] [employer] / Product Type: Managed Medicaid /     Extended Emergency Contact Information  Primary Emergency Contact: Leobardo Laurent   [facility]  Home Phone: [phone]  Relation: Son  Secondary Emergency Contact: ChristianSid  Mobile Phone: [phone]  Relation: Relative  Preferred language: English   needed? No    Discharge Plan A: Home         [id number] - [place] - [address] AT [facility] [address]  [address] [address]  Phone: [phone] Fax: [phone]      Initial Assessment (most recent)       Adult Discharge Assessment - 06/12/24 1150          Discharge Assessment    Assessment Type Discharge Planning Assessment     Confirmed/corrected address, phone number and insurance Yes     Confirmed Demographics Correct on Facesheet     Source of Information patient     When was your last doctors appointment? 06/11/24   Massimo Spain MD - [facility]    Communicated EDDA with patient/caregiver Date not available/Unable to determine     Reason For Admission Chest pain     People in Home --   Roommate and Roommate's 6 year old son    Facility Arrived From: home     Do you expect to return to your current living situation? Yes     Do you have help at home or someone to help you manage your care at home? Yes     Who are your caregiver(s) and their phone number(s)? Roommate     Prior to hospitilization cognitive status:  Alert/Oriented     Current cognitive status: Alert/Oriented     Walking or Climbing Stairs Difficulty no     Dressing/Bathing Difficulty no     Home Accessibility wheelchair accessible     Equipment Currently Used at Home none     Readmission within 30 days? No     Patient currently being followed by outpatient case management? No     Do you currently have service(s) that help you manage your care at home? No     Do you take prescription medications? Yes     Do you have prescription coverage? Yes     Coverage [facility] - Medicaid     Do you have any problems affording any of your prescribed medications? No     Is the patient taking medications as prescribed? yes     Who is going to help you get home at discharge? Roommate     How do you get to doctors appointments? other (see comments)   Lyft    Are you on dialysis? No     Do you take coumadin? No     Discharge Plan A Home     DME Needed Upon Discharge  none     Discharge Plan discussed with: Patient     Transition of Care Barriers None        Transportation Needs    Has the lack of transportation kept you from medical appointments, meetings, work or from getting things needed for daily living? No                   Anticipated DC dispo: home  Prior Level of Function: independent with ADLs  People in home: Roommate and Roommate's 6 year old son    Comments:  SW met with patient at bedside to introduce role and discuss discharge planning. Patient states her roommate will be help at home and can provide transport at time of discharge. Confirmed demographics, insurance, and emergency contacts. SW provided and explained Advance Care Planning document to Patient at bedside, will remain open if Patient wishes to complete during this hospital stay. SW updated Patient's whiteboard with contact information and anticipated DC plan. CM discharge needs depends on hospital progress. DEAN will continue following to assist with other needs.

## 2024-06-12 NOTE — ASSESSMENT & PLAN NOTE
Chronic, controlled. Latest blood pressure and vitals reviewed-     Temp:  [97.7 °F (36.5 °C)-98 °F (36.7 °C)]   Pulse:  [56-78]   Resp:  [16-19]   BP: (110-207)/(55-97)   SpO2:  [94 %-98 %] .   Home meds for hypertension were reviewed and noted below.       While in the hospital, will manage blood pressure as follows; Adjust home antihypertensive regimen as follows- patient not currently managed with hypertension medications as outpatient, reports out of medications for approximately 6 months.    Will utilize p.r.n. blood pressure medication only if patient's blood pressure greater than 180/110 and she develops symptoms such as worsening chest pain or shortness of breath.    Elevated blood pressure on admission  Improved

## 2024-06-13 VITALS
DIASTOLIC BLOOD PRESSURE: 81 MMHG | HEIGHT: 62 IN | OXYGEN SATURATION: 95 % | BODY MASS INDEX: 42.47 KG/M2 | HEART RATE: 88 BPM | WEIGHT: 230.81 LBS | TEMPERATURE: 98 F | SYSTOLIC BLOOD PRESSURE: 140 MMHG | RESPIRATION RATE: 25 BRPM

## 2024-06-13 LAB
ALBUMIN SERPL BCP-MCNC: 3.7 G/DL (ref 3.5–5.2)
ALP SERPL-CCNC: 162 U/L (ref 55–135)
ALT SERPL W/O P-5'-P-CCNC: 16 U/L (ref 10–44)
ANION GAP SERPL CALC-SCNC: 10 MMOL/L (ref 8–16)
AST SERPL-CCNC: 19 U/L (ref 10–40)
B-HCG UR QL: NEGATIVE
BASOPHILS # BLD AUTO: 0.07 K/UL (ref 0–0.2)
BASOPHILS NFR BLD: 0.6 % (ref 0–1.9)
BILIRUB SERPL-MCNC: 0.6 MG/DL (ref 0.1–1)
BUN SERPL-MCNC: 16 MG/DL (ref 6–20)
CALCIUM SERPL-MCNC: 9.4 MG/DL (ref 8.7–10.5)
CHLORIDE SERPL-SCNC: 107 MMOL/L (ref 95–110)
CHOLEST SERPL-MCNC: 188 MG/DL (ref 120–199)
CHOLEST/HDLC SERPL: 5.2 {RATIO} (ref 2–5)
CO2 SERPL-SCNC: 23 MMOL/L (ref 23–29)
CREAT SERPL-MCNC: 0.9 MG/DL (ref 0.5–1.4)
CTP QC/QA: YES
DIFFERENTIAL METHOD BLD: ABNORMAL
EOSINOPHIL # BLD AUTO: 0.2 K/UL (ref 0–0.5)
EOSINOPHIL NFR BLD: 2 % (ref 0–8)
ERYTHROCYTE [DISTWIDTH] IN BLOOD BY AUTOMATED COUNT: 13.1 % (ref 11.5–14.5)
EST. GFR  (NO RACE VARIABLE): >60 ML/MIN/1.73 M^2
GLUCOSE SERPL-MCNC: 108 MG/DL (ref 70–110)
HCT VFR BLD AUTO: 43.7 % (ref 37–48.5)
HDLC SERPL-MCNC: 36 MG/DL (ref 40–75)
HDLC SERPL: 19.1 % (ref 20–50)
HGB BLD-MCNC: 13.6 G/DL (ref 12–16)
IMM GRANULOCYTES # BLD AUTO: 0.03 K/UL (ref 0–0.04)
IMM GRANULOCYTES NFR BLD AUTO: 0.3 % (ref 0–0.5)
LDLC SERPL CALC-MCNC: 135.6 MG/DL (ref 63–159)
LYMPHOCYTES # BLD AUTO: 3.2 K/UL (ref 1–4.8)
LYMPHOCYTES NFR BLD: 28.5 % (ref 18–48)
MAGNESIUM SERPL-MCNC: 1.9 MG/DL (ref 1.6–2.6)
MCH RBC QN AUTO: 27.3 PG (ref 27–31)
MCHC RBC AUTO-ENTMCNC: 31.1 G/DL (ref 32–36)
MCV RBC AUTO: 88 FL (ref 82–98)
MONOCYTES # BLD AUTO: 0.6 K/UL (ref 0.3–1)
MONOCYTES NFR BLD: 5.6 % (ref 4–15)
NEUTROPHILS # BLD AUTO: 7 K/UL (ref 1.8–7.7)
NEUTROPHILS NFR BLD: 63 % (ref 38–73)
NONHDLC SERPL-MCNC: 152 MG/DL
NRBC BLD-RTO: 0 /100 WBC
PHOSPHATE SERPL-MCNC: 2.9 MG/DL (ref 2.7–4.5)
PLATELET # BLD AUTO: 356 K/UL (ref 150–450)
PMV BLD AUTO: 10.3 FL (ref 9.2–12.9)
POTASSIUM SERPL-SCNC: 4 MMOL/L (ref 3.5–5.1)
PROT SERPL-MCNC: 7.4 G/DL (ref 6–8.4)
RBC # BLD AUTO: 4.98 M/UL (ref 4–5.4)
SODIUM SERPL-SCNC: 140 MMOL/L (ref 136–145)
TRIGL SERPL-MCNC: 82 MG/DL (ref 30–150)
WBC # BLD AUTO: 11.13 K/UL (ref 3.9–12.7)

## 2024-06-13 PROCEDURE — 96372 THER/PROPH/DIAG INJ SC/IM: CPT | Mod: 59 | Performed by: INTERNAL MEDICINE

## 2024-06-13 PROCEDURE — G0378 HOSPITAL OBSERVATION PER HR: HCPCS

## 2024-06-13 PROCEDURE — 25000003 PHARM REV CODE 250: Performed by: INTERNAL MEDICINE

## 2024-06-13 PROCEDURE — 85025 COMPLETE CBC W/AUTO DIFF WBC: CPT | Performed by: NURSE PRACTITIONER

## 2024-06-13 PROCEDURE — 83735 ASSAY OF MAGNESIUM: CPT | Performed by: NURSE PRACTITIONER

## 2024-06-13 PROCEDURE — 63600175 PHARM REV CODE 636 W HCPCS: Performed by: INTERNAL MEDICINE

## 2024-06-13 PROCEDURE — 25000003 PHARM REV CODE 250: Performed by: PHYSICIAN ASSISTANT

## 2024-06-13 PROCEDURE — 81025 URINE PREGNANCY TEST: CPT | Performed by: INTERNAL MEDICINE

## 2024-06-13 PROCEDURE — 84100 ASSAY OF PHOSPHORUS: CPT | Performed by: NURSE PRACTITIONER

## 2024-06-13 PROCEDURE — 93458 L HRT ARTERY/VENTRICLE ANGIO: CPT | Mod: 26,,, | Performed by: INTERNAL MEDICINE

## 2024-06-13 PROCEDURE — 93458 L HRT ARTERY/VENTRICLE ANGIO: CPT | Performed by: INTERNAL MEDICINE

## 2024-06-13 PROCEDURE — C1769 GUIDE WIRE: HCPCS | Performed by: INTERNAL MEDICINE

## 2024-06-13 PROCEDURE — 80061 LIPID PANEL: CPT | Performed by: INTERNAL MEDICINE

## 2024-06-13 PROCEDURE — C1894 INTRO/SHEATH, NON-LASER: HCPCS | Performed by: INTERNAL MEDICINE

## 2024-06-13 PROCEDURE — 99213 OFFICE O/P EST LOW 20 MIN: CPT | Mod: 25,,, | Performed by: INTERNAL MEDICINE

## 2024-06-13 PROCEDURE — 25500020 PHARM REV CODE 255: Performed by: INTERNAL MEDICINE

## 2024-06-13 PROCEDURE — 36415 COLL VENOUS BLD VENIPUNCTURE: CPT | Performed by: INTERNAL MEDICINE

## 2024-06-13 PROCEDURE — 99152 MOD SED SAME PHYS/QHP 5/>YRS: CPT | Mod: ,,, | Performed by: INTERNAL MEDICINE

## 2024-06-13 PROCEDURE — 36415 COLL VENOUS BLD VENIPUNCTURE: CPT | Performed by: NURSE PRACTITIONER

## 2024-06-13 PROCEDURE — 80053 COMPREHEN METABOLIC PANEL: CPT | Performed by: NURSE PRACTITIONER

## 2024-06-13 RX ORDER — SODIUM CHLORIDE 9 MG/ML
INJECTION, SOLUTION INTRAVENOUS CONTINUOUS
Status: ACTIVE | OUTPATIENT
Start: 2024-06-13 | End: 2024-06-13

## 2024-06-13 RX ORDER — MIDAZOLAM HYDROCHLORIDE 1 MG/ML
INJECTION, SOLUTION INTRAMUSCULAR; INTRAVENOUS
Status: DISCONTINUED | OUTPATIENT
Start: 2024-06-13 | End: 2024-06-13 | Stop reason: HOSPADM

## 2024-06-13 RX ORDER — FENTANYL CITRATE 50 UG/ML
INJECTION, SOLUTION INTRAMUSCULAR; INTRAVENOUS
Status: DISCONTINUED | OUTPATIENT
Start: 2024-06-13 | End: 2024-06-13 | Stop reason: HOSPADM

## 2024-06-13 RX ORDER — DILTIAZEM HYDROCHLORIDE 180 MG/1
180 CAPSULE, COATED, EXTENDED RELEASE ORAL DAILY
Qty: 30 CAPSULE | Refills: 0 | Status: SHIPPED | OUTPATIENT
Start: 2024-06-14 | End: 2024-07-14

## 2024-06-13 RX ORDER — ATORVASTATIN CALCIUM 20 MG/1
20 TABLET, FILM COATED ORAL NIGHTLY
Qty: 30 TABLET | Refills: 0 | Status: SHIPPED | OUTPATIENT
Start: 2024-06-13 | End: 2024-07-13

## 2024-06-13 RX ORDER — DIPHENHYDRAMINE HYDROCHLORIDE 50 MG/ML
INJECTION INTRAMUSCULAR; INTRAVENOUS
Status: DISCONTINUED | OUTPATIENT
Start: 2024-06-13 | End: 2024-06-13 | Stop reason: HOSPADM

## 2024-06-13 RX ORDER — CIPROFLOXACIN 750 MG/1
750 TABLET, FILM COATED ORAL EVERY 12 HOURS
Qty: 4 TABLET | Refills: 0 | Status: SHIPPED | OUTPATIENT
Start: 2024-06-13 | End: 2024-06-15

## 2024-06-13 RX ORDER — NITROGLYCERIN 5 MG/ML
INJECTION, SOLUTION INTRAVENOUS
Status: DISCONTINUED | OUTPATIENT
Start: 2024-06-13 | End: 2024-06-13 | Stop reason: HOSPADM

## 2024-06-13 RX ORDER — HEPARIN SODIUM 1000 [USP'U]/ML
INJECTION, SOLUTION INTRAVENOUS; SUBCUTANEOUS
Status: DISCONTINUED | OUTPATIENT
Start: 2024-06-13 | End: 2024-06-13 | Stop reason: HOSPADM

## 2024-06-13 RX ORDER — VERAPAMIL HYDROCHLORIDE 2.5 MG/ML
INJECTION, SOLUTION INTRAVENOUS
Status: DISCONTINUED | OUTPATIENT
Start: 2024-06-13 | End: 2024-06-13 | Stop reason: HOSPADM

## 2024-06-13 RX ORDER — ASPIRIN 81 MG/1
81 TABLET ORAL DAILY
Qty: 30 TABLET | Refills: 0 | Status: SHIPPED | OUTPATIENT
Start: 2024-06-13 | End: 2024-07-13

## 2024-06-13 RX ORDER — DILTIAZEM HYDROCHLORIDE 180 MG/1
180 CAPSULE, COATED, EXTENDED RELEASE ORAL DAILY
Status: DISCONTINUED | OUTPATIENT
Start: 2024-06-13 | End: 2024-06-13 | Stop reason: HOSPADM

## 2024-06-13 RX ORDER — SODIUM CHLORIDE 9 MG/ML
INJECTION, SOLUTION INTRAVENOUS CONTINUOUS
Status: DISCONTINUED | OUTPATIENT
Start: 2024-06-13 | End: 2024-06-13 | Stop reason: HOSPADM

## 2024-06-13 RX ADMIN — SODIUM CHLORIDE: 9 INJECTION, SOLUTION INTRAVENOUS at 08:06

## 2024-06-13 RX ADMIN — ENOXAPARIN SODIUM 40 MG: 40 INJECTION SUBCUTANEOUS at 08:06

## 2024-06-13 RX ADMIN — CIPROFLOXACIN 750 MG: 750 TABLET, FILM COATED ORAL at 08:06

## 2024-06-13 RX ADMIN — DILTIAZEM HYDROCHLORIDE 180 MG: 180 CAPSULE, COATED, EXTENDED RELEASE ORAL at 02:06

## 2024-06-13 NOTE — HOSPITAL COURSE
6.13.2024  Had episode of chest pain overnight she states.    Was mild.    She had an abnormal TID on her nuclear stress test although no ischemia was seen.    Long-time smoker And vapes.  Discussed heart catheterization with her

## 2024-06-13 NOTE — BRIEF OP NOTE
[facility] - Cath Lab (Hospital)  Brief Operative Note  Cardiology    SUMMARY     Surgery Date: 6/13/2024     Surgeons and Role:     * Susan Colmenares MD - Primary    Assisting Surgeon: None    Pre-op Diagnosis:  Chest pain, unspecified type [R07.9]    Post-op Diagnosis: Post-Op Diagnosis Codes:     * Chest pain, unspecified type [R07.9]    Procedure Performed:     Procedure(s) (LRB):  Left heart cath (Left)  ANGIOGRAM, CORONARY ARTERY (N/A)    Technical Procedures Used:     Operative Findings:   Results for orders placed during the hospital encounter of 06/11/24    Cardiac catheterization    Conclusion    The pre-procedure left ventricular end diastolic pressure was 16.    The estimated blood loss was none.    There was  minimal non-obstructive coronary artery disease.    There was diastolic dysfunction.    The filling pressures on the left were mildly elevated.    Mild mid LAD myocardial bridging.  Tortuous coronaries.  Suspect microvascular disease.    Smoking/vaping cessation    The procedure log was documented by Documenter: Hannah Millan RN and verified by Susan Colmenares MD.    Date: 6/13/2024  Time: 11:13 AM     Okay to DC home once TR band is off.    Checking lipid panel.  We will start statin as outpatient.        Estimated Blood Loss: * No values recorded between 6/13/2024 10:53 AM and 6/13/2024 11:03 AM *         Specimens:   Specimen (24h ago, onward)      None

## 2024-06-13 NOTE — DISCHARGE SUMMARY
[facility] ([facility])  [facility] Medicine  Discharge Summary      Patient Name: Garland Cotton  MRN: [id number]  YAA: [id number]  Patient Class: OP- Observation  Admission Date: 2024  Hospital Length of Stay: 0 days  Discharge Date and Time:  2024 3:03 PM  Attending Physician: Jamie Blair MD   Discharging Provider: Jamie Blair MD  Primary Care Provider: No primary care provider on file.    Primary Care Team: [facility]    HPI:   53 y.o. female patient with a PHx of HTN. Presented to the Emergency Department for evaluation of chest pain which onset 1 week PTA. Patient states that chest pain radiates to neck and symptoms last from 15 to 20 minutes. Patient was seen at   and referred to ED for abnormal EKG. Symptoms are intermittent and moderate in severity. Patient states that pain is worse with exertion. Associated sxs include diaphoresis, dizziness. Patient denies any current pain, and all other sxs at this time. Patient reports a FHx of MI. In the ED, vitals:  186/90, 75, 17, 98 F, 97% RA.  UA:  WBC: 35, bacteria: Moderate.  Troponin: Normal.  EKG:  NSR.  Patient is a full code.  Placed on observation under the care of Hospital Medicine for management of chest pain, UTI.    Procedure(s) (LRB):  Left heart cath (Left)  ANGIOGRAM, CORONARY ARTERY (N/A)      Hospital Course:    admitted for chest pain and hypertension urgency. Worse with exertion, better with rest and aspirin. Radiated to jaw. Onset 2 weeks. Awaiting stress test per cardiology. [facility] maternal  in 50s from MI. Continue intravenous antibiotic(s) for uti. Complains of back pain      Status post cardiac cath procedure. Tolerated well. After discussing cardiology, ok to discharge home with new prescription regimen of aspirin, statin and diltiazem. Discussed vaping cessation.    No acute distress. No respiratory distress. On room air. Left arm in wrist splint. AO3. Morbidly obese. Nursing present.    Patient  seen and evaluated by me. Patient was determined to be suitable for discharge. Patient deemed stable for discharge to home with nurse practitioner to visit home program and prescriptions delivered to bedside.       Goals of Care Treatment Preferences:  Code Status: Full Code      Consults:   Consults (From admission, onward)          Status Ordering Provider     Inpatient consult to Cardiology  Once        Provider:  (Not yet assigned)    Completed MAURO APRIL J.            No new Assessment & Plan notes have been filed under this hospital service since the last note was generated.  Service: Hospital Medicine    Final Active Diagnoses:    Diagnosis Date Noted POA    PRINCIPAL PROBLEM:  Chest pain [R07.9] 06/11/2024 Yes    Hypertension [I10] 06/11/2024 Yes    UTI (urinary tract infection) [N39.0] 06/11/2024 Yes      Problems Resolved During this Admission:       Discharged Condition: stable    Disposition: Home or Self Care    Follow Up:   Follow-up Information       Your Primary Care Provider. Schedule an appointment as soon as possible for a visit in 3 day(s).    Why: hospital follow up             Susan Colmenares MD Follow up in 1 week(s).    Specialties: Interventional Cardiology, Cardiology  Why: hospital follow up  Contact information:  [id number] [place] [address]  [phone]                           Patient Instructions:      Ambulatory referral/consult to [facility]     Diet Cardiac     Lifting restrictions   Order Comments: Per cardiology recommendations       Significant Diagnostic Studies: Labs: CMP   Recent Labs   Lab 06/11/24  1632 06/12/24  0509 06/13/24  0532    139 140   K 4.0 4.4 4.0    108 107   CO2 25 23 23    118* 108   BUN 11 13 16   CREATININE 0.8 1.1 0.9   CALCIUM 9.5 8.9 9.4   PROT 7.7 6.7 7.4   ALBUMIN 3.8 3.4* 3.7   BILITOT 0.3 0.4 0.6   ALKPHOS 177* 155* 162*   AST 21 20 19   ALT 16 13 16   ANIONGAP 9 8 10   , CBC   Recent Labs  "  Lab 06/11/24  1632 06/12/24  0509 06/13/24  0532   WBC 10.62 9.81 11.13   HGB 14.1 12.9 13.6   HCT 44.0 41.1 43.7    332 356   , Lipid Panel No results found for: "CHOL", "HDL", "LDLCALC", "TRIG", "CHOLHDL", Troponin   Recent Labs   Lab 06/11/24  1632 06/11/24 2015 06/12/24  0001   TROPONINI <0.006 <0.006 <0.006   , A1C:   Recent Labs   Lab 06/11/24  1809   HGBA1C 5.6   , and All labs within the past 24 hours have been reviewed  Microbiology: Urine Culture    Lab Results   Component Value Date    LABURIN (A) 06/11/2024     GRAM NEGATIVE EDI  >100,000 cfu/ml  Identification and susceptibility pending      LABURIN (A) 06/11/2024     GRAM NEGATIVE EDI  > 100,000 cfu/ml  Identification and susceptibility pending  No other significant isolate       Radiology: X-Ray: CXR: X-Ray Chest 1 View (CXR):   Results for orders placed or performed during the hospital encounter of 06/11/24   X-Ray Chest 1 View    Narrative    EXAMINATION:  XR CHEST 1 VIEW    CLINICAL HISTORY:  . Chest pain, unspecified    TECHNIQUE:  Single frontal portable view of the chest was performed.    COMPARISON:  None    FINDINGS:  Support devices: None    The lungs are clear, with normal appearance of pulmonary vasculature and no pleural effusion or pneumothorax.      Impression    No acute abnormality.      Electronically signed by: Linda Marie  Date:    06/11/2024  Time:    15:51     Cardiac Graphics: Echocardiogram: Transthoracic echo (TTE) complete ([facility] Only):   Results for orders placed or performed during the hospital encounter of 06/11/24   Echo Saline Bubble? No; Ultrasound enhancing contrast? No   Result Value Ref Range    BSA 2.15 m2    LVOT stroke volume 54.94 cm3    LVIDd 4.34 3.5 - 6.0 cm    LV Systolic Volume 31.94 mL    LV Systolic Volume Index 15.7 mL/m2    LVIDs 2.89 2.1 - 4.0 cm    LV Diastolic Volume 85.11 mL    LV Diastolic Volume Index 41.72 mL/m2    IVS 1.22 (A) 0.6 - 1.1 cm    LVOT diameter 1.88 cm    LVOT area " 2.8 cm2    FS 33 28 - 44 %    Left Ventricle Relative Wall Thickness 0.52 cm    Posterior Wall 1.13 (A) 0.6 - 1.1 cm    LV mass 181.70 g    LV Mass Index 89 g/m2    MV Peak E Iraj 0.68 m/s    TDI LATERAL 0.11 m/s    TDI SEPTAL 0.12 m/s    E/E' ratio 5.91 m/s    MV Peak A Iraj 0.68 m/s    TR Max Iraj 2.30 m/s    E/A ratio 1.00     IVRT 51.38 msec    E wave deceleration time 236.10 msec    LV SEPTAL E/E' RATIO 5.67 m/s    LV LATERAL E/E' RATIO 6.18 m/s    LVOT peak iraj 0.80 m/s    Left Ventricular Outflow Tract Mean Velocity 0.69 cm/s    Left Ventricular Outflow Tract Mean Gradient 1.94 mmHg    RVOT peak VTI 16.0 cm    TAPSE 1.72 cm    LA size 3.12 cm    Left Atrium Minor Axis 3.85 cm    Left Atrium Major Axis 4.16 cm    RA Major Axis 3.95 cm    AV mean gradient 3 mmHg    AV peak gradient 6 mmHg    Ao peak iraj 1.22 m/s    Ao VTI 25.70 cm    LVOT peak VTI 19.80 cm    AV valve area 2.14 cm²    AV Velocity Ratio 0.66     AV index (prosthetic) 0.77     DIPESH by Velocity Ratio 1.82 cm²    MV stenosis pressure 1/2 time 68.47 ms    MV valve area p 1/2 method 3.21 cm2    Triscuspid Valve Regurgitation Peak Gradient 21 mmHg    PV mean gradient 1 mmHg    RVOT peak iraj 0.59 m/s    Ao root annulus 2.61 cm    STJ 2.78 cm    Ascending aorta 2.66 cm    IVC diameter 1.25 cm    Mean e' 0.12 m/s    ZLVIDS -2.01     ZLVIDD -3.37     LA Volume Index 17.2 mL/m2    LA volume 35.00 cm3    LA WIDTH 3.3 cm    RA Width 2.8 cm    TV resting pulmonary artery pressure 24 mmHg    RV TB RVSP 5 mmHg    Est. RA pres 3 mmHg    Narrative      Left Ventricle: The left ventricle is normal in size. Mildly increased   wall thickness. There is mild concentric hypertrophy. There is normal   systolic function with a visually estimated ejection fraction of 55 - 60%.   There is normal diastolic function.    Right Ventricle: Normal right ventricular cavity size. Wall thickness   is normal. Systolic function is normal.    Tricuspid Valve: There is mild  regurgitation.    IVC/SVC: Normal venous pressure at 3 mmHg.     , Stress Test: TID ratio 1.77; negative for ischemia, and cardiac catheterization diastolic dysfunction    Pending Diagnostic Studies:       Procedure Component Value Units Date/Time    Lipid panel [[id number]] Collected: 06/13/24 1232    Order Status: Sent Lab Status: No result     Specimen: Blood            Medications:  Reconciled Home Medications:      Medication List        START taking these medications      aspirin 81 MG EC tablet  Commonly known as: ECOTRIN  Take 1 tablet (81 mg total) by mouth once daily.     atorvastatin 20 MG tablet  Commonly known as: LIPITOR  Take 1 tablet (20 mg total) by mouth every evening.     ciprofloxacin HCl 750 MG tablet  Commonly known as: CIPRO  Take 1 tablet (750 mg total) by mouth every 12 (twelve) hours. for 2 days     diltiaZEM 180 MG 24 hr capsule  Commonly known as: CARDIZEM CD  Take 1 capsule (180 mg total) by mouth once daily.  Start taking on: June 14, 2024            CONTINUE taking these medications      hydrOXYzine pamoate 50 MG Cap  Commonly known as: VISTARIL  Take 50 mg by mouth 4 (four) times daily.              Indwelling Lines/Drains at time of discharge:   Lines/Drains/Airways       None                   Time spent on the discharge of patient: 31 minutes         Jamie Blair MD  Department of Hospital Medicine  [facility] - Tele[facility] ([facility])

## 2024-06-13 NOTE — ASSESSMENT & PLAN NOTE
-Presents with atypical and typical CP features  -Serial troponin negative  -+TTP chest wall  -TTE and MPI stress test pending    6.13.2024   Long-time smoker and vapes.    Mixed chest pain symptoms.    Abnormal TID on nuclear stress test done on ischemia.    Discussed left heart catheterization rule out Triple-vessel or left main disease    Abnormal also be seen in  isolated microvascular disease

## 2024-06-13 NOTE — DISCHARGE INSTRUCTIONS
"    1. DIET: It is advisable for you to follow a diet that limits the intake of salt, sugar, saturated fats and cholesterol.     2. DRIVING: Due to sedation you received during your procedure, DO NOT drive or operate machinery for 24 hours. Avoid making critical decisions or signing legal documents until tomorrow.    3. ACTIVITY: AVOID activities that require bending of the affected arm/wrist for 3 days and submerging the site in water for 3 days. REMOVE the dressing the day after  the procedure, and shower.  Apply a bandaid to site after shower.  WEAR wrist immobilizer until tomorrow night.    You may RESUME your normal activities or prescribed exercise program as instructed by your physician after 3 days.                                                                                                                 4. WOUND CARE: It is not unusual to have a small amount of bruising to appear at or near the puncture site. It is also common to have a tender "knot" develop beneath the skin at the puncture site of the wrist/arm. This is usually scar tissue and is not a cause for concern or alarm. This tender knot may take several weeks to fully resolve. The bruise will usually spread over several days. However, if the lump gets bigger, call your doctor immediately.    5. DISCOMFORT: For general discomfort at the puncture site, you may take 1 or 2 Acetaminophen (Tylenol) tablets every 4 - 6 hours as needed. (Do not take more than 4000 mg a day)    6. SIGNS AND SYMPTOMS TO REPORT:  Call your physician immediately if any of the following occur:                                            1. Loss of feeling, warmth or color to the affected arm/wrist                                                                                                          2. Mild beeding from the site                 3. Pain that is sudden, sharp or persistent in the affected arm/wrist                 4. Swelling or a change in "lump" size, " "increased redness or drainage at the puncture site                                                                               5. High fever (101 degrees or higher)    7. GO TO  THE EMERGENCY ROOM OR CALL 911 IF YOU HAVE: Chest pains or discomforts not relieved with 3 nitroglycerin doses (sublingual tablets or spray), numbness or severe pain of limb, if your limb becomes cold or discolored or if you develop uncontrolled bleeding from the puncture site (quickly apply firm, direct pressure above the site).    1. DIET: It is advisable for you to follow a diet that limits the intake of salt, sugar, saturated fats and cholesterol.     2. DRIVING: Due to sedation you received during your procedure, DO NOT drive or operate machinery for 24 hours. Avoid making critical decisions or signing legal documents until tomorrow.    3. ACTIVITY: AVOID activities that require bending of the affected arm/wrist for 3 days and submerging the site in water for 3 days. REMOVE the dressing the day after  the procedure, and shower.  Apply a bandaid to site after shower.  WEAR wrist immobilizer until tomorrow night.    You may RESUME your normal activities or prescribed exercise program as instructed by your physician after 3 days.                                                                                                                 4. WOUND CARE: It is not unusual to have a small amount of bruising to appear at or near the puncture site. It is also common to have a tender "knot" develop beneath the skin at the puncture site of the wrist/arm. This is usually scar tissue and is not a cause for concern or alarm. This tender knot may take several weeks to fully resolve. The bruise will usually spread over several days. However, if the lump gets bigger, call your doctor immediately.    5. DISCOMFORT: For general discomfort at the puncture site, you may take 1 or 2 Acetaminophen (Tylenol) tablets every 4 - 6 hours as needed. (Do not " "take more than 4000 mg a day)    6. SIGNS AND SYMPTOMS TO REPORT:  Call your physician immediately if any of the following occur:                                            1. Loss of feeling, warmth or color to the affected arm/wrist                                                                                                          2. Mild beeding from the site                 3. Pain that is sudden, sharp or persistent in the affected arm/wrist                 4. Swelling or a change in "lump" size, increased redness or drainage at the puncture site                                                                               5. High fever (101 degrees or higher)    7. GO TO  THE EMERGENCY ROOM OR CALL 911 IF YOU HAVE: Chest pains or discomforts not relieved with 3 nitroglycerin doses (sublingual tablets or spray), numbness or severe pain of limb, if your limb becomes cold or discolored or if you develop uncontrolled bleeding from the puncture site (quickly apply firm, direct pressure above the site).    You have been started on new medication(s) from this hospitalization. Please take as directed and schedule hospital follow up appointments with your primary providers.    A nurse practitioner may be contacting you to assess your status post-hospitalization.     "

## 2024-06-13 NOTE — PROGRESS NOTES
[facility] Tele[facility] ([facility])  Cardiology  Progress Note    Patient Name: Garland Cotton  MRN: [id number]  Admission Date: 6/11/2024  Hospital Length of Stay: 0 days  Code Status: Full Code   Attending Physician: Jamie Blair MD   Primary Care Physician: No primary care provider on file.  Expected Discharge Date:   Principal Problem:Chest pain    Subjective:     Hospital Course:   6.13.2024  Had episode of chest pain overnight she states.    Was mild.    She had an abnormal TID on her nuclear stress test although no ischemia was seen.    Long-time smoker And vapes.  Discussed heart catheterization with her       Interval History:     Review of Systems   Cardiovascular:  Positive for chest pain. Negative for dyspnea on exertion, palpitations and syncope.   Genitourinary: Negative.    Neurological: Negative.      Objective:     Vital Signs (Most Recent):  Temp: 97.9 °F (36.6 °C) (06/13/24 0716)  Pulse: 88 (06/13/24 0924)  Resp: 19 (06/13/24 0716)  BP: 129/66 (06/13/24 0716)  SpO2: 97 % (06/13/24 0716) Vital Signs (24h Range):  Temp:  [97.5 °F (36.4 °C)-98.4 °F (36.9 °C)] 97.9 °F (36.6 °C)  Pulse:  [64-89] 88  Resp:  [16-19] 19  SpO2:  [95 %-99 %] 97 %  BP: (125-188)/(66-80) 129/66     Weight: 104.7 kg (230 lb 13.2 oz)  Body mass index is 42.22 kg/m².     SpO2: 97 %         Intake/Output Summary (Last 24 hours) at 6/13/2024 0959  Last data filed at 6/13/2024 0550  Gross per 24 hour   Intake 600 ml   Output --   Net 600 ml       Lines/Drains/Airways       Peripheral Intravenous Line  Duration                  Peripheral IV - Single Lumen 06/11/24  20 G Anterior;Left;Proximal Forearm 2 days                       Physical Exam  Vitals reviewed.   Constitutional:       Appearance: She is well-developed.   Neck:      Vascular: No carotid bruit.   Cardiovascular:      Rate and Rhythm: Normal rate and regular rhythm.      Pulses: Intact distal pulses.      Heart sounds: Normal heart sounds. No murmur heard.  Pulmonary:       Breath sounds: Normal breath sounds.   Neurological:      Mental Status: She is oriented to person, place, and time.            Significant Labs: All pertinent lab results from the last 24 hours have been reviewed. and   Recent Lab Results         06/13/24  0532   06/12/24  1334        Systolic blood pressure   125       Albumin 3.7                  ALT 16         Anion Gap 10         AST 19         Baso # 0.07         Basophil % 0.6         BILIRUBIN TOTAL 0.6  Comment: For infants and newborns, interpretation of results should be based  on gestational age, weight and in agreement with clinical  observations.    Premature Infant recommended reference ranges:  Up to 24 hours.............<8.0 mg/dL  Up to 48 hours............<12.0 mg/dL  3-5 days..................<15.0 mg/dL  6-29 days.................<15.0 mg/dL           BUN 16         Calcium 9.4         Chloride 107         CO2 23         Creatinine 0.9         HR at rest   68       Diastolic blood pressure   69       Differential Method Automated         End diastolic index (mL/m2)   165       End diastolic index (mL/m2)   101       eGFR >60         Ejection Fraction- High Stress   73       Eos # 0.2         Eos % 2.0         End systolic index (mL/m2)   64       End systolic index (mL/m2)   28       Glucose 108         Gran # (ANC) 7.0         Gran % 63.0         Hematocrit 43.7         Hemoglobin 13.6         Immature Grans (Abs) 0.03  Comment: Mild elevation in immature granulocytes is non specific and   can be seen in a variety of conditions including stress response,   acute inflammation, trauma and pregnancy. Correlation with other   laboratory and clinical findings is essential.           Immature Granulocytes 0.3         Lymph # 3.2         Lymph % 28.5         Magnesium  1.9         MCH 27.3         MCHC 31.1         MCV 88         Mono # 0.6         Mono % 5.6         MPV 10.3         nRBC 0         Nuc Rest EF   84       Nuc Stress EF   69        85% Max Predicted HR   142       Max Predicted HR   167       [facility] CV CPX PATIENT IS FEMALE   1.0       [facility] CV CPX PATIENT IS MALE   0.0       Peak Diatolic BP   69       Peak HR   91       Peak RPP   11,375       Peak Systolic BP   125       % Max HR Achieved   57       RPP   8,500       Phosphorus Level 2.9         Platelet Count 356         Potassium 4.0         PROTEIN TOTAL 7.4         RBC 4.98         RDW 13.1         EF + QEF   59       Sodium 140         WBC 11.13                 Significant Imaging: Stress Test: .    Results for orders placed during the hospital encounter of 06/11/24    Nuclear Stress - Cardiology Interpreted    Interpretation Summary    Normal myocardial perfusion scan. There is no evidence of myocardial ischemia or infarction.    The gated perfusion images showed an ejection fraction of 84% at rest. The gated perfusion images showed an ejection fraction of 69% post stress. Normal ejection fraction is greater than 59%.    The ECG portion of the study is negative for ischemia.    There were no arrhythmias during stress.    The reported TID ratio was 1.77.    Assessment and Plan:     Brief HPI:     * Chest pain  -Presents with atypical and typical CP features  -Serial troponin negative  -+TTP chest wall  -TTE and MPI stress test pending    6.13.2024   Long-time smoker and vapes.    Mixed chest pain symptoms.    Abnormal TID on nuclear stress test  though no ischemia.    Discussed left heart catheterization rule out Triple-vessel or left main disease    Abnormal tid can also be seen in  isolated microvascular disease      UTI (urinary tract infection)  -Mgmt as per primary team, on abx    Hypertension  -Monitor BP trend  -Adjust meds as needed        VTE Risk Mitigation (From admission, onward)           Ordered     enoxaparin injection 40 mg  Every 12 hours         06/11/24 1800     IP VTE HIGH RISK PATIENT  Once         06/11/24 1758     Place sequential compression device  Until  discontinued         06/11/24 1757                    Susan Colmenares MD  Cardiology  [facility] ([facility])

## 2024-06-13 NOTE — NURSING
Transferred to [facility] via bed on portable moniter.  IV fluid complete/saline locked.  L wrist dressing dry and intact.  Report at bedside to [facility].  Care handed off to [facility].

## 2024-06-13 NOTE — INTERVAL H&P NOTE
The patient has been examined and the H&P has been reviewed:    I concur with the findings and no changes have occurred since H&P was written.    Anesthesia/Surgery risks, benefits and alternative options discussed and understood by patient/family.    ABNORMAL TID      Active Hospital Problems    Diagnosis  POA    *Chest pain [R07.9]  Yes    Hypertension [I10]  Yes    UTI (urinary tract infection) [N39.0]  Yes      Resolved Hospital Problems   No resolved problems to display.

## 2024-06-13 NOTE — SUBJECTIVE & OBJECTIVE
Interval History:     Review of Systems   Cardiovascular:  Positive for chest pain. Negative for dyspnea on exertion, palpitations and syncope.   Genitourinary: Negative.    Neurological: Negative.      Objective:     Vital Signs (Most Recent):  Temp: 97.9 °F (36.6 °C) (06/13/24 0716)  Pulse: 88 (06/13/24 0924)  Resp: 19 (06/13/24 0716)  BP: 129/66 (06/13/24 0716)  SpO2: 97 % (06/13/24 0716) Vital Signs (24h Range):  Temp:  [97.5 °F (36.4 °C)-98.4 °F (36.9 °C)] 97.9 °F (36.6 °C)  Pulse:  [64-89] 88  Resp:  [16-19] 19  SpO2:  [95 %-99 %] 97 %  BP: (125-188)/(66-80) 129/66     Weight: 104.7 kg (230 lb 13.2 oz)  Body mass index is 42.22 kg/m².     SpO2: 97 %         Intake/Output Summary (Last 24 hours) at 6/13/2024 0959  Last data filed at 6/13/2024 0550  Gross per 24 hour   Intake 600 ml   Output --   Net 600 ml       Lines/Drains/Airways       Peripheral Intravenous Line  Duration                  Peripheral IV - Single Lumen 06/11/24  20 G Anterior;Left;Proximal Forearm 2 days                       Physical Exam  Vitals reviewed.   Constitutional:       Appearance: She is well-developed.   Neck:      Vascular: No carotid bruit.   Cardiovascular:      Rate and Rhythm: Normal rate and regular rhythm.      Pulses: Intact distal pulses.      Heart sounds: Normal heart sounds. No murmur heard.  Pulmonary:      Breath sounds: Normal breath sounds.   Neurological:      Mental Status: She is oriented to person, place, and time.            Significant Labs: All pertinent lab results from the last 24 hours have been reviewed. and   Recent Lab Results         06/13/24  0532   06/12/24  1334        Systolic blood pressure   125       Albumin 3.7                  ALT 16         Anion Gap 10         AST 19         Baso # 0.07         Basophil % 0.6         BILIRUBIN TOTAL 0.6  Comment: For infants and newborns, interpretation of results should be based  on gestational age, weight and in agreement with  clinical  observations.    Premature Infant recommended reference ranges:  Up to 24 hours.............<8.0 mg/dL  Up to 48 hours............<12.0 mg/dL  3-5 days..................<15.0 mg/dL  6-29 days.................<15.0 mg/dL           BUN 16         Calcium 9.4         Chloride 107         CO2 23         Creatinine 0.9         HR at rest   68       Diastolic blood pressure   69       Differential Method Automated         End diastolic index (mL/m2)   165       End diastolic index (mL/m2)   101       eGFR >60         Ejection Fraction- High Stress   73       Eos # 0.2         Eos % 2.0         End systolic index (mL/m2)   64       End systolic index (mL/m2)   28       Glucose 108         Gran # (ANC) 7.0         Gran % 63.0         Hematocrit 43.7         Hemoglobin 13.6         Immature Grans (Abs) 0.03  Comment: Mild elevation in immature granulocytes is non specific and   can be seen in a variety of conditions including stress response,   acute inflammation, trauma and pregnancy. Correlation with other   laboratory and clinical findings is essential.           Immature Granulocytes 0.3         Lymph # 3.2         Lymph % 28.5         Magnesium  1.9         MCH 27.3         MCHC 31.1         MCV 88         Mono # 0.6         Mono % 5.6         MPV 10.3         nRBC 0         Nuc Rest EF   84       Nuc Stress EF   69       85% Max Predicted HR   142       Max Predicted HR   167       OHS CV CPX PATIENT IS FEMALE   1.0       OHS CV CPX PATIENT IS MALE   0.0       Peak Diatolic BP   69       Peak HR   91       Peak RPP   11,375       Peak Systolic BP   125       % Max HR Achieved   57       RPP   8,500       Phosphorus Level 2.9         Platelet Count 356         Potassium 4.0         PROTEIN TOTAL 7.4         RBC 4.98         RDW 13.1         EF + QEF   59       Sodium 140         WBC 11.13                 Significant Imaging: Stress Test: .    Results for orders placed during the hospital encounter of  06/11/24    Nuclear Stress - Cardiology Interpreted    Interpretation Summary    Normal myocardial perfusion scan. There is no evidence of myocardial ischemia or infarction.    The gated perfusion images showed an ejection fraction of 84% at rest. The gated perfusion images showed an ejection fraction of 69% post stress. Normal ejection fraction is greater than 59%.    The ECG portion of the study is negative for ischemia.    There were no arrhythmias during stress.    The reported TID ratio was 1.77.

## 2024-06-14 LAB — BACTERIA UR CULT: ABNORMAL

## 2024-07-03 ENCOUNTER — OFFICE VISIT (OUTPATIENT)
Dept: CARDIOLOGY | Facility: CLINIC | Age: 54
End: 2024-07-03
Payer: MEDICAID

## 2024-07-03 VITALS
BODY MASS INDEX: 42.54 KG/M2 | WEIGHT: 232.56 LBS | SYSTOLIC BLOOD PRESSURE: 160 MMHG | OXYGEN SATURATION: 96 % | DIASTOLIC BLOOD PRESSURE: 88 MMHG | HEART RATE: 88 BPM

## 2024-07-03 DIAGNOSIS — E78.49 OTHER HYPERLIPIDEMIA: ICD-10-CM

## 2024-07-03 DIAGNOSIS — G47.30 SLEEP APNEA, UNSPECIFIED TYPE: ICD-10-CM

## 2024-07-03 DIAGNOSIS — I25.118 CORONARY ARTERY DISEASE OF NATIVE ARTERY OF NATIVE HEART WITH STABLE ANGINA PECTORIS: Primary | ICD-10-CM

## 2024-07-03 DIAGNOSIS — R07.89 OTHER CHEST PAIN: ICD-10-CM

## 2024-07-03 DIAGNOSIS — I16.0 HYPERTENSIVE URGENCY: ICD-10-CM

## 2024-07-03 DIAGNOSIS — I10 PRIMARY HYPERTENSION: ICD-10-CM

## 2024-07-03 DIAGNOSIS — R07.9 EXERTIONAL CHEST PAIN: ICD-10-CM

## 2024-07-03 PROCEDURE — 3077F SYST BP >= 140 MM HG: CPT | Mod: CPTII,,, | Performed by: INTERNAL MEDICINE

## 2024-07-03 PROCEDURE — 99213 OFFICE O/P EST LOW 20 MIN: CPT | Mod: PBBFAC | Performed by: INTERNAL MEDICINE

## 2024-07-03 PROCEDURE — 3008F BODY MASS INDEX DOCD: CPT | Mod: CPTII,,, | Performed by: INTERNAL MEDICINE

## 2024-07-03 PROCEDURE — 3079F DIAST BP 80-89 MM HG: CPT | Mod: CPTII,,, | Performed by: INTERNAL MEDICINE

## 2024-07-03 PROCEDURE — 1160F RVW MEDS BY RX/DR IN RCRD: CPT | Mod: CPTII,,, | Performed by: INTERNAL MEDICINE

## 2024-07-03 PROCEDURE — 1159F MED LIST DOCD IN RCRD: CPT | Mod: CPTII,,, | Performed by: INTERNAL MEDICINE

## 2024-07-03 PROCEDURE — 99214 OFFICE O/P EST MOD 30 MIN: CPT | Mod: S$PBB,,, | Performed by: INTERNAL MEDICINE

## 2024-07-03 PROCEDURE — 99999 PR PBB SHADOW E&M-EST. PATIENT-LVL III: CPT | Mod: PBBFAC,,, | Performed by: INTERNAL MEDICINE

## 2024-07-03 PROCEDURE — 3044F HG A1C LEVEL LT 7.0%: CPT | Mod: CPTII,,, | Performed by: INTERNAL MEDICINE

## 2024-07-03 PROCEDURE — G2211 COMPLEX E/M VISIT ADD ON: HCPCS | Mod: S$PBB,,, | Performed by: INTERNAL MEDICINE

## 2024-07-03 RX ORDER — ASPIRIN 81 MG/1
81 TABLET ORAL DAILY
Qty: 90 TABLET | Refills: 3 | Status: SHIPPED | OUTPATIENT
Start: 2024-07-03

## 2024-07-03 RX ORDER — ATORVASTATIN CALCIUM 20 MG/1
20 TABLET, FILM COATED ORAL NIGHTLY
Qty: 90 TABLET | Refills: 0 | Status: SHIPPED | OUTPATIENT
Start: 2024-07-03

## 2024-07-03 RX ORDER — LOSARTAN POTASSIUM 25 MG/1
25 TABLET ORAL NIGHTLY
Qty: 90 TABLET | Refills: 1 | Status: SHIPPED | OUTPATIENT
Start: 2024-07-03 | End: 2025-07-03

## 2024-07-03 RX ORDER — DILTIAZEM HYDROCHLORIDE 240 MG/1
240 CAPSULE, COATED, EXTENDED RELEASE ORAL DAILY
Qty: 90 CAPSULE | Refills: 1 | Status: SHIPPED | OUTPATIENT
Start: 2024-07-03

## 2024-07-03 NOTE — PROGRESS NOTES
Subjective:   Patient ID:  Garland Cotton is a 53 y.o. female who presents for cardiac consult of Hospital Follow Up      Referral by: No referring provider defined for this encounter.     Reason for consult:       HPI  The patient came in today for cardiac consult of Hospital Follow Up      Garland Cotton is a 53 y.o. female pt with non obs CAD, HTN, HLD, obesity presents for CV follow up.     2024 - HPI:   53 y.o. female patient with a PHx of HTN. Presented to the Emergency Department for evaluation of chest pain which onset 1 week PTA. Patient states that chest pain radiates to neck and symptoms last from 15 to 20 minutes. Patient was seen at   and referred to ED for abnormal EKG. Symptoms are intermittent and moderate in severity. Patient states that pain is worse with exertion. Associated sxs include diaphoresis, dizziness. Patient denies any current pain, and all other sxs at this time. Patient reports a FHx of MI. In the ED, vitals:  186/90, 75, 17, 98 F, 97% RA.  UA:  WBC: 35, bacteria: Moderate.  Troponin: Normal.  EKG:  NSR.  Patient is a full code.  Placed on observation under the care of Hospital Medicine for management of chest pain, UTI.  Hospital Course:    admitted for chest pain and hypertension urgency. Worse with exertion, better with rest and aspirin. Radiated to jaw. Onset 2 weeks. Awaiting stress test per cardiology. [facility] maternal  in 50s from MI. Continue intravenous antibiotic(s) for uti. Complains of back pain      status post cardiac cath procedure. Tolerated well. After discussing cardiology, ok to discharge home with new prescription regimen of aspirin, statin and diltiazem. Discussed vaping cessation.  No acute distress. No respiratory distress. On room air. Left arm in wrist splint. AO3. Morbidly obese. Nursing present.       7/3/24 - hosp follow up visit  Pt had exertional CP - had [facility] 2024 - non obs CAD with Mild mid LAD myocardial bridging.  Tortuous  coronaries.  Suspect microvascular disease.    BP elevated today 160/88. HR 88. BMI 42 - 232lbs   She has hemorrhoids and wants to hold aspirin.     FH - mother -  at 55 of MI    Results for orders placed during the hospital encounter of 24    Echo Saline Bubble? No; Ultrasound enhancing contrast? No    Interpretation Summary    Left Ventricle: The left ventricle is normal in size. Mildly increased wall thickness. There is mild concentric hypertrophy. There is normal systolic function with a visually estimated ejection fraction of 55 - 60%. There is normal diastolic function.    Right Ventricle: Normal right ventricular cavity size. Wall thickness is normal. Systolic function is normal.    Tricuspid Valve: There is mild regurgitation.    IVC/SVC: Normal venous pressure at 3 mmHg.      Results for orders placed during the hospital encounter of 24    Nuclear Stress - Cardiology Interpreted    Interpretation Summary    Normal myocardial perfusion scan. There is no evidence of myocardial ischemia or infarction.    The gated perfusion images showed an ejection fraction of 84% at rest. The gated perfusion images showed an ejection fraction of 69% post stress. Normal ejection fraction is greater than 59%.    The ECG portion of the study is negative for ischemia.    There were no arrhythmias during stress.    The reported TID ratio was 1.77.      Results for orders placed during the hospital encounter of 24    Cardiac catheterization    Conclusion    The pre-procedure left ventricular end diastolic pressure was 16.    The estimated blood loss was none.    There was  minimal non-obstructive coronary artery disease.    There was diastolic dysfunction.    The filling pressures on the left were mildly elevated.    Mild mid LAD myocardial bridging.  Tortuous coronaries.  Suspect microvascular disease.    Smoking/vaping cessation    The procedure log was documented by Documenter: Hannah Millan RN and  verified by Susan Colmenares MD.    Date: 2024  Time: 11:13 AM      No cardiac monitor results found for the past 12 months         Past Medical History:   Diagnosis Date    Hypertension        Past Surgical History:   Procedure Laterality Date     SECTION      CORONARY ANGIOGRAPHY N/A 2024    Procedure: ANGIOGRAM, CORONARY ARTERY;  Surgeon: Susan Colmenares MD;  Location: [facility] CATH LAB;  Service: Cardiology;  Laterality: N/A;    KNEE SURGERY      LEFT HEART CATHETERIZATION Left 2024    Procedure: Left heart cath;  Surgeon: Susan Colmenares MD;  Location: [facility] CATH LAB;  Service: Cardiology;  Laterality: Left;       Social History     Tobacco Use    Smoking status: Every Day     Types: Cigarettes    Smokeless tobacco: Never   Substance Use Topics    Alcohol use: Not Currently    Drug use: Not Currently       Family History   Problem Relation Name Age of Onset    Hyperlipidemia Mother      Heart disease Mother      Cancer Mother      Cancer Father         Patient's Medications   New Prescriptions    LOSARTAN (COZAAR) 25 MG TABLET    Take 1 tablet (25 mg total) by mouth every evening.   Previous Medications    HYDROXYZINE PAMOATE (VISTARIL) 50 MG CAP    Take 50 mg by mouth 4 (four) times daily.   Modified Medications    Modified Medication Previous Medication    ASPIRIN (ECOTRIN) 81 MG EC TABLET aspirin (ECOTRIN) 81 MG EC tablet       Take 1 tablet (81 mg total) by mouth once daily.    Take 1 tablet (81 mg total) by mouth once daily.    ATORVASTATIN (LIPITOR) 20 MG TABLET atorvastatin (LIPITOR) 20 MG tablet       Take 1 tablet (20 mg total) by mouth every evening.    Take 1 tablet (20 mg total) by mouth every evening.    DILTIAZEM (CARDIZEM CD) 240 MG 24 HR CAPSULE diltiaZEM (CARDIZEM CD) 180 MG 24 hr capsule       Take 1 capsule (240 mg total) by mouth once daily.    Take 1 capsule (180 mg total) by mouth once daily.   Discontinued Medications    No medications on file       Review of  Systems   Constitutional:  Positive for malaise/fatigue.   HENT: Negative.     Eyes: Negative.    Respiratory:  Positive for shortness of breath.    Cardiovascular:  Positive for chest pain.   Gastrointestinal: Negative.    Genitourinary: Negative.    Musculoskeletal: Negative.    Skin: Negative.    Neurological: Negative.    Endo/Heme/Allergies: Negative.    Psychiatric/Behavioral: Negative.     All 12 systems otherwise negative.      Wt Readings from Last 3 Encounters:   07/03/24 105.5 kg (232 lb 9.4 oz)   06/12/24 104.7 kg (230 lb 13.2 oz)     Temp Readings from Last 3 Encounters:   06/13/24 97.6 °F (36.4 °C) (Temporal)     BP Readings from Last 3 Encounters:   07/03/24 (!) 160/88   06/13/24 (!) 140/81     Pulse Readings from Last 3 Encounters:   07/03/24 88   06/13/24 88       BP (!) 160/88 (BP Location: Left arm, Patient Position: Sitting)   Pulse 88   Wt 105.5 kg (232 lb 9.4 oz)   SpO2 96%   BMI 42.54 kg/m²     Objective:   Physical Exam  Vitals and nursing note reviewed.   Constitutional:       General: She is not in acute distress.     Appearance: She is well-developed. She is obese. She is not diaphoretic.   HENT:      Head: Normocephalic and atraumatic.      Nose: Nose normal.   Eyes:      General: No scleral icterus.     Conjunctiva/sclera: Conjunctivae normal.   Neck:      Thyroid: No thyromegaly.      Vascular: No JVD.   Cardiovascular:      Rate and Rhythm: Normal rate and regular rhythm.      Heart sounds: S1 normal and S2 normal. Murmur heard.      No friction rub. No gallop. No S3 or S4 sounds.   Pulmonary:      Effort: Pulmonary effort is normal. No respiratory distress.      Breath sounds: Normal breath sounds. No stridor. No wheezing or rales.   Chest:      Chest wall: No tenderness.   Abdominal:      General: Bowel sounds are normal. There is no distension.      Palpations: Abdomen is soft. There is no mass.      Tenderness: There is no abdominal tenderness. There is no rebound.    Genitourinary:     Comments: Deferred  Musculoskeletal:         General: No tenderness or deformity. Normal range of motion.      Cervical back: Normal range of motion and neck supple.   Lymphadenopathy:      Cervical: No cervical adenopathy.   Skin:     General: Skin is warm and dry.      Coloration: Skin is not pale.      Findings: No erythema or rash.   Neurological:      Mental Status: She is alert and oriented to person, place, and time.      Motor: No abnormal muscle tone.      Coordination: Coordination normal.   Psychiatric:         Behavior: Behavior normal.         Thought Content: Thought content normal.         Judgment: Judgment normal.         Lab Results   Component Value Date     06/13/2024    K 4.0 06/13/2024     06/13/2024    CO2 23 06/13/2024    BUN 16 06/13/2024    CREATININE 0.9 06/13/2024     06/13/2024    HGBA1C 5.6 06/11/2024    MG 1.9 06/13/2024    AST 19 06/13/2024    ALT 16 06/13/2024    ALBUMIN 3.7 06/13/2024    PROT 7.4 06/13/2024    BILITOT 0.6 06/13/2024    WBC 11.13 06/13/2024    HGB 13.6 06/13/2024    HCT 43.7 06/13/2024    MCV 88 06/13/2024     06/13/2024    TSH 1.642 06/11/2024    CHOL 188 06/13/2024    HDL 36 (L) 06/13/2024    LDLCALC 135.6 06/13/2024    TRIG 82 06/13/2024    BNP <10 06/11/2024         BNP (pg/mL)   Date Value   06/11/2024 <10          Assessment:      1. Coronary artery disease of native artery of native heart with stable angina pectoris    2. Primary hypertension    3. Other chest pain    4. Exertional chest pain    5. Hypertensive urgency    6. BMI 40.0-44.9, adult    7. Sleep apnea, unspecified type    8. Other hyperlipidemia        Plan:         CAD, recent admission 6/2024  - [facility] 6/2024 - non obs CAD with Mild mid LAD myocardial bridging.  Tortuous coronaries.  Suspect microvascular disease.  - cont med therapy - asa, statin, dilt    2. HTN - elevated   - titrate meds - increase Dilt to 180 mg  - add Losartan 25 mg QHS  - r/o  MARÍA ELENA  - cont low salt diet     3. Obesity  - cont weight loss    4. HLD  - cont statin    Visit today included increased complexity associated with the care of the episodic problem chest pain addressed and managing the longitudinal care of the patient due to the serious and/or complex managed problem(s) .      Thank you for allowing me to participate in this patient's care. Please do not hesitate to contact me with any questions or concerns. Consult note has been forwarded to the referral physician.

## 2024-07-19 ENCOUNTER — PATIENT MESSAGE (OUTPATIENT)
Dept: PULMONOLOGY | Facility: CLINIC | Age: 54
End: 2024-07-19
Payer: MEDICAID

## 2024-09-16 PROBLEM — N39.0 UTI (URINARY TRACT INFECTION): Status: RESOLVED | Noted: 2024-06-11 | Resolved: 2024-09-16

## 2024-09-25 RX ORDER — ATORVASTATIN CALCIUM 20 MG/1
20 TABLET, FILM COATED ORAL NIGHTLY
Qty: 90 TABLET | Refills: 0 | Status: SHIPPED | OUTPATIENT
Start: 2024-09-25

## 2024-09-26 ENCOUNTER — LAB VISIT (OUTPATIENT)
Dept: LAB | Facility: HOSPITAL | Age: 54
End: 2024-09-26
Attending: INTERNAL MEDICINE
Payer: MEDICAID

## 2024-09-26 DIAGNOSIS — E78.49 OTHER HYPERLIPIDEMIA: ICD-10-CM

## 2024-09-26 LAB
ALBUMIN SERPL BCP-MCNC: 3.9 G/DL (ref 3.5–5.2)
ALP SERPL-CCNC: 175 U/L (ref 55–135)
ALT SERPL W/O P-5'-P-CCNC: 13 U/L (ref 10–44)
ANION GAP SERPL CALC-SCNC: 6 MMOL/L (ref 8–16)
AST SERPL-CCNC: 17 U/L (ref 10–40)
BILIRUB SERPL-MCNC: 0.5 MG/DL (ref 0.1–1)
BUN SERPL-MCNC: 8 MG/DL (ref 6–20)
CALCIUM SERPL-MCNC: 9.6 MG/DL (ref 8.7–10.5)
CHLORIDE SERPL-SCNC: 107 MMOL/L (ref 95–110)
CHOLEST SERPL-MCNC: 142 MG/DL (ref 120–199)
CHOLEST/HDLC SERPL: 3.4 {RATIO} (ref 2–5)
CK SERPL-CCNC: 72 U/L (ref 20–180)
CO2 SERPL-SCNC: 28 MMOL/L (ref 23–29)
CREAT SERPL-MCNC: 0.9 MG/DL (ref 0.5–1.4)
EST. GFR  (NO RACE VARIABLE): >60 ML/MIN/1.73 M^2
GLUCOSE SERPL-MCNC: 100 MG/DL (ref 70–110)
HDLC SERPL-MCNC: 42 MG/DL (ref 40–75)
HDLC SERPL: 29.6 % (ref 20–50)
LDLC SERPL CALC-MCNC: 81.4 MG/DL (ref 63–159)
NONHDLC SERPL-MCNC: 100 MG/DL
POTASSIUM SERPL-SCNC: 4.6 MMOL/L (ref 3.5–5.1)
PROT SERPL-MCNC: 7.6 G/DL (ref 6–8.4)
SODIUM SERPL-SCNC: 141 MMOL/L (ref 136–145)
TRIGL SERPL-MCNC: 93 MG/DL (ref 30–150)

## 2024-09-26 PROCEDURE — 36415 COLL VENOUS BLD VENIPUNCTURE: CPT | Performed by: INTERNAL MEDICINE

## 2024-09-26 PROCEDURE — 80053 COMPREHEN METABOLIC PANEL: CPT | Performed by: INTERNAL MEDICINE

## 2024-09-26 PROCEDURE — 80061 LIPID PANEL: CPT | Performed by: INTERNAL MEDICINE

## 2024-09-26 PROCEDURE — 82550 ASSAY OF CK (CPK): CPT | Performed by: INTERNAL MEDICINE

## 2024-10-03 ENCOUNTER — OFFICE VISIT (OUTPATIENT)
Dept: CARDIOLOGY | Facility: CLINIC | Age: 54
End: 2024-10-03
Payer: MEDICAID

## 2024-10-03 VITALS
DIASTOLIC BLOOD PRESSURE: 92 MMHG | SYSTOLIC BLOOD PRESSURE: 162 MMHG | HEART RATE: 85 BPM | WEIGHT: 232.13 LBS | OXYGEN SATURATION: 99 % | BODY MASS INDEX: 42.72 KG/M2 | HEIGHT: 62 IN

## 2024-10-03 DIAGNOSIS — E78.49 OTHER HYPERLIPIDEMIA: ICD-10-CM

## 2024-10-03 DIAGNOSIS — G47.30 SLEEP APNEA, UNSPECIFIED TYPE: ICD-10-CM

## 2024-10-03 DIAGNOSIS — I25.118 CORONARY ARTERY DISEASE OF NATIVE ARTERY OF NATIVE HEART WITH STABLE ANGINA PECTORIS: Primary | ICD-10-CM

## 2024-10-03 DIAGNOSIS — R07.9 EXERTIONAL CHEST PAIN: ICD-10-CM

## 2024-10-03 DIAGNOSIS — R07.89 OTHER CHEST PAIN: ICD-10-CM

## 2024-10-03 DIAGNOSIS — F41.9 ANXIETY: ICD-10-CM

## 2024-10-03 DIAGNOSIS — I10 PRIMARY HYPERTENSION: ICD-10-CM

## 2024-10-03 PROCEDURE — 99214 OFFICE O/P EST MOD 30 MIN: CPT | Mod: PBBFAC | Performed by: INTERNAL MEDICINE

## 2024-10-03 PROCEDURE — 99999 PR PBB SHADOW E&M-EST. PATIENT-LVL IV: CPT | Mod: PBBFAC,,, | Performed by: INTERNAL MEDICINE

## 2024-10-03 PROCEDURE — G2211 COMPLEX E/M VISIT ADD ON: HCPCS | Mod: S$PBB,,, | Performed by: INTERNAL MEDICINE

## 2024-10-03 PROCEDURE — 1160F RVW MEDS BY RX/DR IN RCRD: CPT | Mod: CPTII,,, | Performed by: INTERNAL MEDICINE

## 2024-10-03 PROCEDURE — 1159F MED LIST DOCD IN RCRD: CPT | Mod: CPTII,,, | Performed by: INTERNAL MEDICINE

## 2024-10-03 PROCEDURE — 4010F ACE/ARB THERAPY RXD/TAKEN: CPT | Mod: CPTII,,, | Performed by: INTERNAL MEDICINE

## 2024-10-03 PROCEDURE — 99214 OFFICE O/P EST MOD 30 MIN: CPT | Mod: S$PBB,,, | Performed by: INTERNAL MEDICINE

## 2024-10-03 PROCEDURE — 3044F HG A1C LEVEL LT 7.0%: CPT | Mod: CPTII,,, | Performed by: INTERNAL MEDICINE

## 2024-10-03 PROCEDURE — 3077F SYST BP >= 140 MM HG: CPT | Mod: CPTII,,, | Performed by: INTERNAL MEDICINE

## 2024-10-03 PROCEDURE — 3008F BODY MASS INDEX DOCD: CPT | Mod: CPTII,,, | Performed by: INTERNAL MEDICINE

## 2024-10-03 PROCEDURE — 3080F DIAST BP >= 90 MM HG: CPT | Mod: CPTII,,, | Performed by: INTERNAL MEDICINE

## 2024-10-03 RX ORDER — LOSARTAN POTASSIUM 100 MG/1
100 TABLET ORAL NIGHTLY
Qty: 90 TABLET | Refills: 1 | Status: SHIPPED | OUTPATIENT
Start: 2024-10-03 | End: 2025-10-03

## 2024-10-03 NOTE — PROGRESS NOTES
Subjective:   Patient ID:  Garland Cotton is a 53 y.o. female who presents for cardiac consult of No chief complaint on file.      Referral by: No referring provider defined for this encounter.     Reason for consult:       HPI  The patient came in today for cardiac consult of No chief complaint on file.      Garland Cotton is a 53 y.o. female pt with non obs CAD, HTN, HLD, obesity presents for CV follow up.     2024 - HPI:   53 y.o. female patient with a PHx of HTN. Presented to the Emergency Department for evaluation of chest pain which onset 1 week PTA. Patient states that chest pain radiates to neck and symptoms last from 15 to 20 minutes. Patient was seen at   and referred to ED for abnormal EKG. Symptoms are intermittent and moderate in severity. Patient states that pain is worse with exertion. Associated sxs include diaphoresis, dizziness. Patient denies any current pain, and all other sxs at this time. Patient reports a FHx of MI. In the ED, vitals:  186/90, 75, 17, 98 F, 97% RA.  UA:  WBC: 35, bacteria: Moderate.  Troponin: Normal.  EKG:  NSR.  Patient is a full code.  Placed on observation under the care of Hospital Medicine for management of chest pain, UTI.  Hospital Course:    admitted for chest pain and hypertension urgency. Worse with exertion, better with rest and aspirin. Radiated to jaw. Onset 2 weeks. Awaiting stress test per cardiology. [facility] maternal  in 50s from MI. Continue intravenous antibiotic(s) for uti. Complains of back pain   status post cardiac cath procedure. Tolerated well. After discussing cardiology, ok to discharge home with new prescription regimen of aspirin, statin and diltiazem. Discussed vaping cessation.  No acute distress. No respiratory distress. On room air. Left arm in wrist splint. AO3. Morbidly obese. Nursing present.       7/3/24 - hosp follow up visit  Pt had exertional CP - had [facility] 2024 - non obs CAD with Mild mid LAD myocardial bridging.   Tortuous coronaries.  Suspect microvascular disease.  BP elevated today 160/88. HR 88. BMI 42 - 232lbs   She has hemorrhoids and wants to hold aspirin.     10/3/24  BP elevated 162/92. HR 85. BMI 42 - 232 lbs      FH - mother -  at 55 of MI    Results for orders placed during the hospital encounter of 24    Echo Saline Bubble? No; Ultrasound enhancing contrast? No    Interpretation Summary    Left Ventricle: The left ventricle is normal in size. Mildly increased wall thickness. There is mild concentric hypertrophy. There is normal systolic function with a visually estimated ejection fraction of 55 - 60%. There is normal diastolic function.    Right Ventricle: Normal right ventricular cavity size. Wall thickness is normal. Systolic function is normal.    Tricuspid Valve: There is mild regurgitation.    IVC/SVC: Normal venous pressure at 3 mmHg.      Results for orders placed during the hospital encounter of 24    Nuclear Stress - Cardiology Interpreted    Interpretation Summary    Normal myocardial perfusion scan. There is no evidence of myocardial ischemia or infarction.    The gated perfusion images showed an ejection fraction of 84% at rest. The gated perfusion images showed an ejection fraction of 69% post stress. Normal ejection fraction is greater than 59%.    The ECG portion of the study is negative for ischemia.    There were no arrhythmias during stress.    The reported TID ratio was 1.77.      Results for orders placed during the hospital encounter of 24    Cardiac catheterization    Conclusion    The pre-procedure left ventricular end diastolic pressure was 16.    The estimated blood loss was none.    There was  minimal non-obstructive coronary artery disease.    There was diastolic dysfunction.    The filling pressures on the left were mildly elevated.    Mild mid LAD myocardial bridging.  Tortuous coronaries.  Suspect microvascular disease.    Smoking/vaping cessation    The  procedure log was documented by Documenter: Hannah Millan RN and verified by Susan Colmenares MD.    Date: 2024  Time: 11:13 AM      No cardiac monitor results found for the past 12 months         Past Medical History:   Diagnosis Date    Hypertension        Past Surgical History:   Procedure Laterality Date     SECTION      CORONARY ANGIOGRAPHY N/A 2024    Procedure: ANGIOGRAM, CORONARY ARTERY;  Surgeon: Susan Colmenares MD;  Location: [facility] CATH LAB;  Service: Cardiology;  Laterality: N/A;    KNEE SURGERY      LEFT HEART CATHETERIZATION Left 2024    Procedure: Left heart cath;  Surgeon: Susan Colmenares MD;  Location: [facility] CATH LAB;  Service: Cardiology;  Laterality: Left;       Social History     Tobacco Use    Smoking status: Every Day     Types: Cigarettes    Smokeless tobacco: Never   Substance Use Topics    Alcohol use: Not Currently    Drug use: Not Currently       Family History   Problem Relation Name Age of Onset    Hyperlipidemia Mother      Heart disease Mother      Cancer Mother      Cancer Father         Patient's Medications   New Prescriptions    No medications on file   Previous Medications    ASPIRIN (ECOTRIN) 81 MG EC TABLET    Take 1 tablet (81 mg total) by mouth once daily.    ATORVASTATIN (LIPITOR) 20 MG TABLET    Take 1 tablet (20 mg total) by mouth every evening.    DILTIAZEM (CARDIZEM CD) 240 MG 24 HR CAPSULE    Take 1 capsule (240 mg total) by mouth once daily.    HYDROXYZINE PAMOATE (VISTARIL) 50 MG CAP    Take 50 mg by mouth 4 (four) times daily.    LOSARTAN (COZAAR) 25 MG TABLET    Take 1 tablet (25 mg total) by mouth every evening.   Modified Medications    No medications on file   Discontinued Medications    No medications on file       Review of Systems   Constitutional:  Positive for malaise/fatigue.   HENT: Negative.     Eyes: Negative.    Respiratory:  Positive for shortness of breath.    Cardiovascular:  Positive for chest pain.   Gastrointestinal:  "Negative.    Genitourinary: Negative.    Musculoskeletal: Negative.    Skin: Negative.    Neurological: Negative.    Endo/Heme/Allergies: Negative.    Psychiatric/Behavioral: Negative.     All 12 systems otherwise negative.      Wt Readings from Last 3 Encounters:   10/03/24 105.3 kg (232 lb 2.3 oz)   07/03/24 105.5 kg (232 lb 9.4 oz)   06/12/24 104.7 kg (230 lb 13.2 oz)     Temp Readings from Last 3 Encounters:   06/13/24 97.6 °F (36.4 °C) (Temporal)     BP Readings from Last 3 Encounters:   10/03/24 (!) 162/92   07/03/24 (!) 160/88   06/13/24 (!) 140/81     Pulse Readings from Last 3 Encounters:   10/03/24 85   07/03/24 88   06/13/24 88       BP (!) 162/92 (BP Location: Left arm, Patient Position: Sitting)   Pulse 85   Ht 5' 2" (1.575 m)   Wt 105.3 kg (232 lb 2.3 oz)   SpO2 99%   BMI 42.46 kg/m²     Objective:   Physical Exam  Vitals and nursing note reviewed.   Constitutional:       General: She is not in acute distress.     Appearance: She is well-developed. She is obese. She is not diaphoretic.   HENT:      Head: Normocephalic and atraumatic.      Nose: Nose normal.   Eyes:      General: No scleral icterus.     Conjunctiva/sclera: Conjunctivae normal.   Neck:      Thyroid: No thyromegaly.      Vascular: No JVD.   Cardiovascular:      Rate and Rhythm: Normal rate and regular rhythm.      Heart sounds: S1 normal and S2 normal. Murmur heard.      No friction rub. No gallop. No S3 or S4 sounds.   Pulmonary:      Effort: Pulmonary effort is normal. No respiratory distress.      Breath sounds: Normal breath sounds. No stridor. No wheezing or rales.   Chest:      Chest wall: No tenderness.   Abdominal:      General: Bowel sounds are normal. There is no distension.      Palpations: Abdomen is soft. There is no mass.      Tenderness: There is no abdominal tenderness. There is no rebound.   Genitourinary:     Comments: Deferred  Musculoskeletal:         General: No tenderness or deformity. Normal range of motion.    "   Cervical back: Normal range of motion and neck supple.   Lymphadenopathy:      Cervical: No cervical adenopathy.   Skin:     General: Skin is warm and dry.      Coloration: Skin is not pale.      Findings: No erythema or rash.   Neurological:      Mental Status: She is alert and oriented to person, place, and time.      Motor: No abnormal muscle tone.      Coordination: Coordination normal.   Psychiatric:         Behavior: Behavior normal.         Thought Content: Thought content normal.         Judgment: Judgment normal.         Lab Results   Component Value Date     09/26/2024    K 4.6 09/26/2024     09/26/2024    CO2 28 09/26/2024    BUN 8 09/26/2024    CREATININE 0.9 09/26/2024     09/26/2024    HGBA1C 5.6 06/11/2024    MG 1.9 06/13/2024    AST 17 09/26/2024    ALT 13 09/26/2024    ALBUMIN 3.9 09/26/2024    PROT 7.6 09/26/2024    BILITOT 0.5 09/26/2024    WBC 11.13 06/13/2024    HGB 13.6 06/13/2024    HCT 43.7 06/13/2024    MCV 88 06/13/2024     06/13/2024    TSH 1.642 06/11/2024    CHOL 142 09/26/2024    HDL 42 09/26/2024    LDLCALC 81.4 09/26/2024    TRIG 93 09/26/2024    BNP <10 06/11/2024         BNP (pg/mL)   Date Value   06/11/2024 <10          Assessment:      1. Coronary artery disease of native artery of native heart with stable angina pectoris    2. Primary hypertension    3. Other chest pain    4. Exertional chest pain    5. BMI 40.0-44.9, adult    6. Sleep apnea, unspecified type    7. Other hyperlipidemia          Plan:         CAD, had an admission 6/2024  - [facility] 6/2024 - non obs CAD with Mild mid LAD myocardial bridging.  Tortuous coronaries.  Suspect microvascular disease.  - cont med therapy - asa, statin, dilt    2. HTN - elevated   - titrate meds - increased Dilt to 180 mg  - add Losartan 25 mg QHS --> increase to 100 mg QHS   - r/o MARÍA ELENA ? Not done  - cont low salt diet     3. Obesity  - cont weight loss    4. HLD  - cont statin    5. Anxiety  - refer to psych      Visit today included increased complexity associated with the care of the episodic problem chest pain addressed and managing the longitudinal care of the patient due to the serious and/or complex managed problem(s) .      Thank you for allowing me to participate in this patient's care. Please do not hesitate to contact me with any questions or concerns. Consult note has been forwarded to the referral physician.

## 2024-11-07 ENCOUNTER — OFFICE VISIT (OUTPATIENT)
Dept: CARDIOLOGY | Facility: CLINIC | Age: 54
End: 2024-11-07
Payer: MEDICAID

## 2024-11-07 ENCOUNTER — PATIENT MESSAGE (OUTPATIENT)
Dept: CARDIOLOGY | Facility: CLINIC | Age: 54
End: 2024-11-07

## 2024-11-07 VITALS
HEIGHT: 62 IN | HEART RATE: 78 BPM | DIASTOLIC BLOOD PRESSURE: 80 MMHG | BODY MASS INDEX: 42.84 KG/M2 | SYSTOLIC BLOOD PRESSURE: 130 MMHG | WEIGHT: 232.81 LBS

## 2024-11-07 DIAGNOSIS — F41.9 ANXIETY: ICD-10-CM

## 2024-11-07 DIAGNOSIS — E78.49 OTHER HYPERLIPIDEMIA: ICD-10-CM

## 2024-11-07 DIAGNOSIS — I25.118 CORONARY ARTERY DISEASE OF NATIVE ARTERY OF NATIVE HEART WITH STABLE ANGINA PECTORIS: Primary | ICD-10-CM

## 2024-11-07 DIAGNOSIS — I10 PRIMARY HYPERTENSION: ICD-10-CM

## 2024-11-07 DIAGNOSIS — R07.9 EXERTIONAL CHEST PAIN: ICD-10-CM

## 2024-11-07 DIAGNOSIS — G47.30 SLEEP APNEA, UNSPECIFIED TYPE: ICD-10-CM

## 2024-11-07 DIAGNOSIS — R07.89 OTHER CHEST PAIN: ICD-10-CM

## 2024-11-07 DIAGNOSIS — I25.10 ASCVD (ARTERIOSCLEROTIC CARDIOVASCULAR DISEASE): ICD-10-CM

## 2024-11-07 PROCEDURE — 4010F ACE/ARB THERAPY RXD/TAKEN: CPT | Mod: CPTII,,, | Performed by: INTERNAL MEDICINE

## 2024-11-07 PROCEDURE — 3075F SYST BP GE 130 - 139MM HG: CPT | Mod: CPTII,,, | Performed by: INTERNAL MEDICINE

## 2024-11-07 PROCEDURE — 99999 PR PBB SHADOW E&M-EST. PATIENT-LVL III: CPT | Mod: PBBFAC,,, | Performed by: INTERNAL MEDICINE

## 2024-11-07 PROCEDURE — 3008F BODY MASS INDEX DOCD: CPT | Mod: CPTII,,, | Performed by: INTERNAL MEDICINE

## 2024-11-07 PROCEDURE — 99214 OFFICE O/P EST MOD 30 MIN: CPT | Mod: S$PBB,,, | Performed by: INTERNAL MEDICINE

## 2024-11-07 PROCEDURE — 3079F DIAST BP 80-89 MM HG: CPT | Mod: CPTII,,, | Performed by: INTERNAL MEDICINE

## 2024-11-07 PROCEDURE — 1160F RVW MEDS BY RX/DR IN RCRD: CPT | Mod: CPTII,,, | Performed by: INTERNAL MEDICINE

## 2024-11-07 PROCEDURE — G2211 COMPLEX E/M VISIT ADD ON: HCPCS | Mod: S$PBB,,, | Performed by: INTERNAL MEDICINE

## 2024-11-07 PROCEDURE — 99213 OFFICE O/P EST LOW 20 MIN: CPT | Mod: PBBFAC | Performed by: INTERNAL MEDICINE

## 2024-11-07 PROCEDURE — 3044F HG A1C LEVEL LT 7.0%: CPT | Mod: CPTII,,, | Performed by: INTERNAL MEDICINE

## 2024-11-07 PROCEDURE — 1159F MED LIST DOCD IN RCRD: CPT | Mod: CPTII,,, | Performed by: INTERNAL MEDICINE

## 2024-11-07 RX ORDER — LOSARTAN POTASSIUM 100 MG/1
100 TABLET ORAL NIGHTLY
Qty: 90 TABLET | Refills: 1 | Status: SHIPPED | OUTPATIENT
Start: 2024-11-07 | End: 2025-11-07

## 2024-11-07 RX ORDER — ASPIRIN 81 MG/1
81 TABLET ORAL DAILY
Qty: 90 TABLET | Refills: 3 | Status: SHIPPED | OUTPATIENT
Start: 2024-11-07

## 2024-11-07 RX ORDER — DILTIAZEM HYDROCHLORIDE 240 MG/1
240 CAPSULE, COATED, EXTENDED RELEASE ORAL DAILY
Qty: 90 CAPSULE | Refills: 1 | Status: SHIPPED | OUTPATIENT
Start: 2024-11-07

## 2024-11-07 RX ORDER — SEMAGLUTIDE 0.25 MG/.5ML
0.25 INJECTION, SOLUTION SUBCUTANEOUS
Qty: 3 ML | Refills: 1 | Status: SHIPPED | OUTPATIENT
Start: 2024-11-07

## 2024-11-07 RX ORDER — ATORVASTATIN CALCIUM 20 MG/1
20 TABLET, FILM COATED ORAL NIGHTLY
Qty: 90 TABLET | Refills: 0 | Status: SHIPPED | OUTPATIENT
Start: 2024-11-07

## 2024-11-07 NOTE — PROGRESS NOTES
Subjective:   Patient ID:  Garland Cotton is a 54 y.o. female who presents for cardiac consult of No chief complaint on file.      Referral by: No referring provider defined for this encounter.     Reason for consult:       HPI  The patient came in today for cardiac consult of No chief complaint on file.      Garland Cotton is a 54 y.o. female pt with non obs CAD, HTN, HLD, obesity presents for CV follow up.     2024 - HPI:   53 y.o. female patient with a PHx of HTN. Presented to the Emergency Department for evaluation of chest pain which onset 1 week PTA. Patient states that chest pain radiates to neck and symptoms last from 15 to 20 minutes. Patient was seen at   and referred to ED for abnormal EKG. Symptoms are intermittent and moderate in severity. Patient states that pain is worse with exertion. Associated sxs include diaphoresis, dizziness. Patient denies any current pain, and all other sxs at this time. Patient reports a FHx of MI. In the ED, vitals:  186/90, 75, 17, 98 F, 97% RA.  UA:  WBC: 35, bacteria: Moderate.  Troponin: Normal.  EKG:  NSR.  Patient is a full code.  Placed on observation under the care of Hospital Medicine for management of chest pain, UTI.  Hospital Course:    admitted for chest pain and hypertension urgency. Worse with exertion, better with rest and aspirin. Radiated to jaw. Onset 2 weeks. Awaiting stress test per cardiology. [facility] maternal  in 50s from MI. Continue intravenous antibiotic(s) for uti. Complains of back pain   status post cardiac cath procedure. Tolerated well. After discussing cardiology, ok to discharge home with new prescription regimen of aspirin, statin and diltiazem. Discussed vaping cessation.  No acute distress. No respiratory distress. On room air. Left arm in wrist splint. AO3. Morbidly obese. Nursing present.       7/3/24 - hosp follow up visit  Pt had exertional CP - had [facility] 2024 - non obs CAD with Mild mid LAD myocardial bridging.   Tortuous coronaries.  Suspect microvascular disease.  BP elevated today 160/88. HR 88. BMI 42 - 232lbs   She has hemorrhoids and wants to hold aspirin.     10/3/24  BP elevated 162/92. HR 85. BMI 42 - 232 lbs    24  BP improved - 130/80. HR 78. BMI 42 - 232 lbs     Benefits of Glp1 agonist like medication prescribed reviewed with patient including improvement of insulin resistance which is the underlying hormonal dysfunction causing and leading to diabetes. This improves hormonal balance and can lower blood sugars if patient sugars are elevated. The patient was explained how the medicine works. I also reviewed side effects of medication including loss of appetite , heartburn/GERD, constipation, diarrhea, nausea (usually low blood sugar if before or between meals ), vomiting bloating , headache and risk of cancer if patient has personal or family history of MEN syndrome.       FH - mother -  at 55 of MI    Results for orders placed during the hospital encounter of 24    Echo Saline Bubble? No; Ultrasound enhancing contrast? No    Interpretation Summary    Left Ventricle: The left ventricle is normal in size. Mildly increased wall thickness. There is mild concentric hypertrophy. There is normal systolic function with a visually estimated ejection fraction of 55 - 60%. There is normal diastolic function.    Right Ventricle: Normal right ventricular cavity size. Wall thickness is normal. Systolic function is normal.    Tricuspid Valve: There is mild regurgitation.    IVC/SVC: Normal venous pressure at 3 mmHg.      Results for orders placed during the hospital encounter of 24    Nuclear Stress - Cardiology Interpreted    Interpretation Summary    Normal myocardial perfusion scan. There is no evidence of myocardial ischemia or infarction.    The gated perfusion images showed an ejection fraction of 84% at rest. The gated perfusion images showed an ejection fraction of 69% post stress. Normal ejection  fraction is greater than 59%.    The ECG portion of the study is negative for ischemia.    There were no arrhythmias during stress.    The reported TID ratio was 1.77.      Results for orders placed during the hospital encounter of 24    Cardiac catheterization    Conclusion    The pre-procedure left ventricular end diastolic pressure was 16.    The estimated blood loss was none.    There was  minimal non-obstructive coronary artery disease.    There was diastolic dysfunction.    The filling pressures on the left were mildly elevated.    Mild mid LAD myocardial bridging.  Tortuous coronaries.  Suspect microvascular disease.    Smoking/vaping cessation    The procedure log was documented by Documenter: Hannah Millan RN and verified by Susan Colmenares MD.    Date: 2024  Time: 11:13 AM      No cardiac monitor results found for the past 12 months         Past Medical History:   Diagnosis Date    Hypertension        Past Surgical History:   Procedure Laterality Date     SECTION      CORONARY ANGIOGRAPHY N/A 2024    Procedure: ANGIOGRAM, CORONARY ARTERY;  Surgeon: Susan Colmenares MD;  Location: [facility] CATH LAB;  Service: Cardiology;  Laterality: N/A;    KNEE SURGERY      LEFT HEART CATHETERIZATION Left 2024    Procedure: Left heart cath;  Surgeon: Susan Colmenares MD;  Location: [facility] CATH LAB;  Service: Cardiology;  Laterality: Left;       Social History     Tobacco Use    Smoking status: Every Day     Types: Cigarettes    Smokeless tobacco: Never   Substance Use Topics    Alcohol use: Not Currently    Drug use: Not Currently       Family History   Problem Relation Name Age of Onset    Hyperlipidemia Mother      Heart disease Mother      Cancer Mother      Cancer Father         Patient's Medications   New Prescriptions    No medications on file   Previous Medications    ASPIRIN (ECOTRIN) 81 MG EC TABLET    Take 1 tablet (81 mg total) by mouth once daily.    ATORVASTATIN (LIPITOR) 20  "MG TABLET    Take 1 tablet (20 mg total) by mouth every evening.    DILTIAZEM (CARDIZEM CD) 240 MG 24 HR CAPSULE    Take 1 capsule (240 mg total) by mouth once daily.    HYDROXYZINE PAMOATE (VISTARIL) 50 MG CAP    Take 50 mg by mouth 4 (four) times daily.    LOSARTAN (COZAAR) 100 MG TABLET    Take 1 tablet (100 mg total) by mouth every evening.   Modified Medications    No medications on file   Discontinued Medications    No medications on file       Review of Systems   Constitutional:  Positive for malaise/fatigue.   HENT: Negative.     Eyes: Negative.    Respiratory:  Positive for shortness of breath.    Cardiovascular:  Positive for chest pain.   Gastrointestinal: Negative.    Genitourinary: Negative.    Musculoskeletal: Negative.    Skin: Negative.    Neurological: Negative.    Endo/Heme/Allergies: Negative.    Psychiatric/Behavioral: Negative.     All 12 systems otherwise negative.      Wt Readings from Last 3 Encounters:   11/07/24 105.6 kg (232 lb 12.9 oz)   10/03/24 105.3 kg (232 lb 2.3 oz)   07/03/24 105.5 kg (232 lb 9.4 oz)     Temp Readings from Last 3 Encounters:   06/13/24 97.6 °F (36.4 °C) (Temporal)     BP Readings from Last 3 Encounters:   11/07/24 130/80   10/03/24 (!) 162/92   07/03/24 (!) 160/88     Pulse Readings from Last 3 Encounters:   11/07/24 78   10/03/24 85   07/03/24 88       /80 (BP Location: Left arm, Patient Position: Sitting)   Pulse 78   Ht 5' 2" (1.575 m)   Wt 105.6 kg (232 lb 12.9 oz)   BMI 42.58 kg/m²     Objective:   Physical Exam  Vitals and nursing note reviewed.   Constitutional:       General: She is not in acute distress.     Appearance: She is well-developed. She is obese. She is not diaphoretic.   HENT:      Head: Normocephalic and atraumatic.      Nose: Nose normal.   Eyes:      General: No scleral icterus.     Conjunctiva/sclera: Conjunctivae normal.   Neck:      Thyroid: No thyromegaly.      Vascular: No JVD.   Cardiovascular:      Rate and Rhythm: Normal rate " and regular rhythm.      Heart sounds: S1 normal and S2 normal. Murmur heard.      No friction rub. No gallop. No S3 or S4 sounds.   Pulmonary:      Effort: Pulmonary effort is normal. No respiratory distress.      Breath sounds: Normal breath sounds. No stridor. No wheezing or rales.   Chest:      Chest wall: No tenderness.   Abdominal:      General: Bowel sounds are normal. There is no distension.      Palpations: Abdomen is soft. There is no mass.      Tenderness: There is no abdominal tenderness. There is no rebound.   Genitourinary:     Comments: Deferred  Musculoskeletal:         General: No tenderness or deformity. Normal range of motion.      Cervical back: Normal range of motion and neck supple.   Lymphadenopathy:      Cervical: No cervical adenopathy.   Skin:     General: Skin is warm and dry.      Coloration: Skin is not pale.      Findings: No erythema or rash.   Neurological:      Mental Status: She is alert and oriented to person, place, and time.      Motor: No abnormal muscle tone.      Coordination: Coordination normal.   Psychiatric:         Behavior: Behavior normal.         Thought Content: Thought content normal.         Judgment: Judgment normal.         Lab Results   Component Value Date     09/26/2024    K 4.6 09/26/2024     09/26/2024    CO2 28 09/26/2024    BUN 8 09/26/2024    CREATININE 0.9 09/26/2024     09/26/2024    HGBA1C 5.6 06/11/2024    MG 1.9 06/13/2024    AST 17 09/26/2024    ALT 13 09/26/2024    ALBUMIN 3.9 09/26/2024    PROT 7.6 09/26/2024    BILITOT 0.5 09/26/2024    WBC 11.13 06/13/2024    HGB 13.6 06/13/2024    HCT 43.7 06/13/2024    MCV 88 06/13/2024     06/13/2024    TSH 1.642 06/11/2024    CHOL 142 09/26/2024    HDL 42 09/26/2024    LDLCALC 81.4 09/26/2024    TRIG 93 09/26/2024    BNP <10 06/11/2024         BNP (pg/mL)   Date Value   06/11/2024 <10          Assessment:      1. Coronary artery disease of native artery of native heart with stable  angina pectoris    2. Primary hypertension    3. Other chest pain    4. BMI 40.0-44.9, adult    5. Exertional chest pain    6. Sleep apnea, unspecified type    7. Anxiety    8. Other hyperlipidemia            Plan:       CAD, had an admission 6/2024  - [place] 6/2024 - non obs CAD with Mild mid LAD myocardial bridging.  Tortuous coronaries.  Suspect microvascular disease.  - cont med therapy - asa, statin, dilt    2. HTN - improved   - titrate meds - increased Dilt to 180 mg  - add Losartan 25 mg QHS --> increase to 100 mg QHS   - r/o MARÍA ELENA ? Not done  - cont low salt diet     3. Obesity BMI 42 - 232 lbs   - cont weight loss  - start Wegovy  0.25 mg     4. HLD  - cont statin    5. Anxiety  - refer to psych     Visit today included increased complexity associated with the care of the episodic problem chest pain addressed and managing the longitudinal care of the patient due to the serious and/or complex managed problem(s) .      Thank you for allowing me to participate in this patient's care. Please do not hesitate to contact me with any questions or concerns. Consult note has been forwarded to the referral physician.

## 2024-11-22 ENCOUNTER — PATIENT MESSAGE (OUTPATIENT)
Dept: CARDIOLOGY | Facility: CLINIC | Age: 54
End: 2024-11-22
Payer: MEDICAID

## 2025-01-13 DIAGNOSIS — I10 PRIMARY HYPERTENSION: Primary | ICD-10-CM

## 2025-05-09 RX ORDER — ATORVASTATIN CALCIUM 20 MG/1
20 TABLET, FILM COATED ORAL NIGHTLY
Qty: 90 TABLET | Refills: 0 | Status: SHIPPED | OUTPATIENT
Start: 2025-05-09

## (undated) DEVICE — ANGIOTOUCH KIT

## (undated) DEVICE — KIT GLIDESHEATH SLEND 6FR 10CM

## (undated) DEVICE — CATH 4FR JL 3.5

## (undated) DEVICE — PACK HEART CATH BR

## (undated) DEVICE — NDL PERCUTANEOUS 21G 7CM VASC

## (undated) DEVICE — KIT MANIFOLD LOW PRESS TUBING

## (undated) DEVICE — CATH INFINITI 4F 3DRC 100CM

## (undated) DEVICE — KIT SYR REUSABLE

## (undated) DEVICE — OMNIPAQUE 300MG 150ML VIAL

## (undated) DEVICE — CATH ANG PIGTAIL 4FR INFINITY

## (undated) DEVICE — WIRE GUIDE TEFLON 3CM .035 145

## (undated) DEVICE — DRAPE ANGIO BRACH 38X44IN

## (undated) DEVICE — BAND TR COMP DEVICE REG 24CM